# Patient Record
Sex: FEMALE | URBAN - METROPOLITAN AREA
[De-identification: names, ages, dates, MRNs, and addresses within clinical notes are randomized per-mention and may not be internally consistent; named-entity substitution may affect disease eponyms.]

---

## 2017-11-08 ENCOUNTER — INPATIENT (INPATIENT)
Facility: HOSPITAL | Age: 82
LOS: 4 days | Discharge: HOME | DRG: 378 | End: 2017-11-13
Attending: INTERNAL MEDICINE | Admitting: INTERNAL MEDICINE
Payer: MEDICARE

## 2017-11-08 VITALS
DIASTOLIC BLOOD PRESSURE: 64 MMHG | HEIGHT: 62 IN | OXYGEN SATURATION: 98 % | TEMPERATURE: 98 F | HEART RATE: 130 BPM | SYSTOLIC BLOOD PRESSURE: 107 MMHG | RESPIRATION RATE: 16 BRPM

## 2017-11-08 DIAGNOSIS — E78.00 PURE HYPERCHOLESTEROLEMIA, UNSPECIFIED: ICD-10-CM

## 2017-11-08 DIAGNOSIS — K21.9 GASTRO-ESOPHAGEAL REFLUX DISEASE WITHOUT ESOPHAGITIS: ICD-10-CM

## 2017-11-08 DIAGNOSIS — D50.9 IRON DEFICIENCY ANEMIA, UNSPECIFIED: ICD-10-CM

## 2017-11-08 DIAGNOSIS — E03.9 HYPOTHYROIDISM, UNSPECIFIED: ICD-10-CM

## 2017-11-08 DIAGNOSIS — K92.1 MELENA: ICD-10-CM

## 2017-11-08 DIAGNOSIS — F41.9 ANXIETY DISORDER, UNSPECIFIED: ICD-10-CM

## 2017-11-08 DIAGNOSIS — L02.429 FURUNCLE OF LIMB, UNSPECIFIED: Chronic | ICD-10-CM

## 2017-11-08 DIAGNOSIS — K92.2 GASTROINTESTINAL HEMORRHAGE, UNSPECIFIED: ICD-10-CM

## 2017-11-08 DIAGNOSIS — Z90.49 ACQUIRED ABSENCE OF OTHER SPECIFIED PARTS OF DIGESTIVE TRACT: Chronic | ICD-10-CM

## 2017-11-08 DIAGNOSIS — Z90.710 ACQUIRED ABSENCE OF BOTH CERVIX AND UTERUS: Chronic | ICD-10-CM

## 2017-11-08 DIAGNOSIS — Z29.9 ENCOUNTER FOR PROPHYLACTIC MEASURES, UNSPECIFIED: ICD-10-CM

## 2017-11-08 DIAGNOSIS — Z90.89 ACQUIRED ABSENCE OF OTHER ORGANS: Chronic | ICD-10-CM

## 2017-11-08 DIAGNOSIS — Z98.49 CATARACT EXTRACTION STATUS, UNSPECIFIED EYE: Chronic | ICD-10-CM

## 2017-11-08 DIAGNOSIS — Z96.653 PRESENCE OF ARTIFICIAL KNEE JOINT, BILATERAL: Chronic | ICD-10-CM

## 2017-11-08 PROBLEM — Z00.00 ENCOUNTER FOR PREVENTIVE HEALTH EXAMINATION: Status: ACTIVE | Noted: 2017-11-08

## 2017-11-08 LAB
ALBUMIN SERPL ELPH-MCNC: 3.2 G/DL — LOW (ref 3.3–5)
ALP SERPL-CCNC: 366 U/L — HIGH (ref 40–120)
ALT FLD-CCNC: 38 U/L — SIGNIFICANT CHANGE UP (ref 12–78)
ANION GAP SERPL CALC-SCNC: 9 MMOL/L — SIGNIFICANT CHANGE UP (ref 5–17)
APTT BLD: 30.6 SEC — SIGNIFICANT CHANGE UP (ref 27.5–37.4)
AST SERPL-CCNC: 35 U/L — SIGNIFICANT CHANGE UP (ref 15–37)
BASOPHILS # BLD AUTO: 0.1 K/UL — SIGNIFICANT CHANGE UP (ref 0–0.2)
BASOPHILS NFR BLD AUTO: 0.6 % — SIGNIFICANT CHANGE UP (ref 0–2)
BILIRUB SERPL-MCNC: 0.5 MG/DL — SIGNIFICANT CHANGE UP (ref 0.2–1.2)
BUN SERPL-MCNC: 33 MG/DL — HIGH (ref 7–23)
CALCIUM SERPL-MCNC: 9.2 MG/DL — SIGNIFICANT CHANGE UP (ref 8.5–10.1)
CHLORIDE SERPL-SCNC: 106 MMOL/L — SIGNIFICANT CHANGE UP (ref 96–108)
CO2 SERPL-SCNC: 25 MMOL/L — SIGNIFICANT CHANGE UP (ref 22–31)
CREAT SERPL-MCNC: 0.72 MG/DL — SIGNIFICANT CHANGE UP (ref 0.5–1.3)
EOSINOPHIL # BLD AUTO: 0 K/UL — SIGNIFICANT CHANGE UP (ref 0–0.5)
EOSINOPHIL NFR BLD AUTO: 0.5 % — SIGNIFICANT CHANGE UP (ref 0–6)
GLUCOSE SERPL-MCNC: 95 MG/DL — SIGNIFICANT CHANGE UP (ref 70–99)
HCT VFR BLD CALC: 33.6 % — LOW (ref 34.5–45)
HGB BLD-MCNC: 10.6 G/DL — LOW (ref 11.5–15.5)
INR BLD: 1.02 RATIO — SIGNIFICANT CHANGE UP (ref 0.88–1.16)
LYMPHOCYTES # BLD AUTO: 1 K/UL — SIGNIFICANT CHANGE UP (ref 1–3.3)
LYMPHOCYTES # BLD AUTO: 9.2 % — LOW (ref 13–44)
MCHC RBC-ENTMCNC: 30.8 PG — SIGNIFICANT CHANGE UP (ref 27–34)
MCHC RBC-ENTMCNC: 31.6 GM/DL — LOW (ref 32–36)
MCV RBC AUTO: 97.6 FL — SIGNIFICANT CHANGE UP (ref 80–100)
MONOCYTES # BLD AUTO: 0.4 K/UL — SIGNIFICANT CHANGE UP (ref 0–0.9)
MONOCYTES NFR BLD AUTO: 3.8 % — SIGNIFICANT CHANGE UP (ref 1–9)
NEUTROPHILS # BLD AUTO: 9.1 K/UL — HIGH (ref 1.8–7.4)
NEUTROPHILS NFR BLD AUTO: 85.9 % — HIGH (ref 43–77)
OB PNL STL: POSITIVE
PLATELET # BLD AUTO: 171 K/UL — SIGNIFICANT CHANGE UP (ref 150–400)
POTASSIUM SERPL-MCNC: 4.4 MMOL/L — SIGNIFICANT CHANGE UP (ref 3.5–5.3)
POTASSIUM SERPL-SCNC: 4.4 MMOL/L — SIGNIFICANT CHANGE UP (ref 3.5–5.3)
PROT SERPL-MCNC: 6.6 G/DL — SIGNIFICANT CHANGE UP (ref 6–8.3)
PROTHROM AB SERPL-ACNC: 11.1 SEC — SIGNIFICANT CHANGE UP (ref 9.8–12.7)
RBC # BLD: 3.44 M/UL — LOW (ref 3.8–5.2)
RBC # FLD: 13.6 % — SIGNIFICANT CHANGE UP (ref 10.3–14.5)
SODIUM SERPL-SCNC: 140 MMOL/L — SIGNIFICANT CHANGE UP (ref 135–145)
WBC # BLD: 10.6 K/UL — HIGH (ref 3.8–10.5)
WBC # FLD AUTO: 10.6 K/UL — HIGH (ref 3.8–10.5)

## 2017-11-08 PROCEDURE — 93010 ELECTROCARDIOGRAM REPORT: CPT

## 2017-11-08 PROCEDURE — 71010: CPT | Mod: 26

## 2017-11-08 PROCEDURE — 99281 EMR DPT VST MAYX REQ PHY/QHP: CPT

## 2017-11-08 RX ORDER — ATORVASTATIN CALCIUM 80 MG/1
0 TABLET, FILM COATED ORAL
Qty: 0 | Refills: 0 | COMMUNITY

## 2017-11-08 RX ORDER — SUCRALFATE 1 G
1 TABLET ORAL
Qty: 0 | Refills: 0 | Status: DISCONTINUED | OUTPATIENT
Start: 2017-11-08 | End: 2017-11-08

## 2017-11-08 RX ORDER — LEVOTHYROXINE SODIUM 125 MCG
25 TABLET ORAL DAILY
Qty: 0 | Refills: 0 | Status: DISCONTINUED | OUTPATIENT
Start: 2017-11-08 | End: 2017-11-13

## 2017-11-08 RX ORDER — PANTOPRAZOLE SODIUM 20 MG/1
8 TABLET, DELAYED RELEASE ORAL
Qty: 80 | Refills: 0 | Status: DISCONTINUED | OUTPATIENT
Start: 2017-11-08 | End: 2017-11-09

## 2017-11-08 RX ORDER — DOXEPIN HCL 100 MG
0 CAPSULE ORAL
Qty: 0 | Refills: 0 | COMMUNITY

## 2017-11-08 RX ORDER — OMEPRAZOLE 10 MG/1
0 CAPSULE, DELAYED RELEASE ORAL
Qty: 0 | Refills: 0 | COMMUNITY

## 2017-11-08 RX ORDER — SODIUM CHLORIDE 9 MG/ML
1000 INJECTION INTRAMUSCULAR; INTRAVENOUS; SUBCUTANEOUS ONCE
Qty: 0 | Refills: 0 | Status: COMPLETED | OUTPATIENT
Start: 2017-11-08 | End: 2017-11-08

## 2017-11-08 RX ORDER — PANTOPRAZOLE SODIUM 20 MG/1
40 TABLET, DELAYED RELEASE ORAL
Qty: 0 | Refills: 0 | Status: DISCONTINUED | OUTPATIENT
Start: 2017-11-08 | End: 2017-11-08

## 2017-11-08 RX ORDER — TRAZODONE HCL 50 MG
0 TABLET ORAL
Qty: 0 | Refills: 0 | COMMUNITY

## 2017-11-08 RX ORDER — EZETIMIBE 10 MG/1
0 TABLET ORAL
Qty: 0 | Refills: 0 | COMMUNITY

## 2017-11-08 RX ORDER — PANTOPRAZOLE SODIUM 20 MG/1
40 TABLET, DELAYED RELEASE ORAL ONCE
Qty: 0 | Refills: 0 | Status: COMPLETED | OUTPATIENT
Start: 2017-11-08 | End: 2017-11-08

## 2017-11-08 RX ORDER — ATORVASTATIN CALCIUM 80 MG/1
10 TABLET, FILM COATED ORAL AT BEDTIME
Qty: 0 | Refills: 0 | Status: DISCONTINUED | OUTPATIENT
Start: 2017-11-08 | End: 2017-11-13

## 2017-11-08 RX ORDER — LEVOTHYROXINE SODIUM 125 MCG
0 TABLET ORAL
Qty: 0 | Refills: 0 | COMMUNITY

## 2017-11-08 RX ORDER — TRAZODONE HCL 50 MG
50 TABLET ORAL AT BEDTIME
Qty: 0 | Refills: 0 | Status: DISCONTINUED | OUTPATIENT
Start: 2017-11-08 | End: 2017-11-13

## 2017-11-08 RX ORDER — SUCRALFATE 1 G
1 TABLET ORAL EVERY 6 HOURS
Qty: 0 | Refills: 0 | Status: DISCONTINUED | OUTPATIENT
Start: 2017-11-08 | End: 2017-11-12

## 2017-11-08 RX ADMIN — PANTOPRAZOLE SODIUM 40 MILLIGRAM(S): 20 TABLET, DELAYED RELEASE ORAL at 12:08

## 2017-11-08 RX ADMIN — SODIUM CHLORIDE 1000 MILLILITER(S): 9 INJECTION INTRAMUSCULAR; INTRAVENOUS; SUBCUTANEOUS at 12:09

## 2017-11-08 RX ADMIN — ATORVASTATIN CALCIUM 10 MILLIGRAM(S): 80 TABLET, FILM COATED ORAL at 22:35

## 2017-11-08 RX ADMIN — Medication 1 GRAM(S): at 17:20

## 2017-11-08 RX ADMIN — Medication 50 MILLIGRAM(S): at 22:35

## 2017-11-08 RX ADMIN — PANTOPRAZOLE SODIUM 10 MG/HR: 20 TABLET, DELAYED RELEASE ORAL at 17:17

## 2017-11-08 RX ADMIN — Medication 1 GRAM(S): at 23:03

## 2017-11-08 NOTE — ED PROVIDER NOTE - ATTENDING CONTRIBUTION TO CARE
Weakness and dark stool in this elderly female. Exam revealed elderly female in NAD with maroon discolored stools and benign abdomen. I agree with plan and management outlined by PA

## 2017-11-08 NOTE — H&P ADULT - PROBLEM SELECTOR PLAN 3
Continue trazadone, paxil Continue trazadone, paxil  Pt can bring doxepin from home due to pharmacy not having it Continue Trazadone, Paxil  Pt can bring doxepin from home due to pharmacy not having it

## 2017-11-08 NOTE — H&P ADULT - NEGATIVE GENERAL GENITOURINARY SYMPTOMS
no hematuria/no bladder infections/no incontinence/no flank pain L/no urine discoloration/no flank pain R/no renal colic

## 2017-11-08 NOTE — H&P ADULT - NSHPSOCIALHISTORY_GEN_ALL_CORE
Lives at home  Uses depends diapers due to fecal incontinence  Occupation:  Tobacco hx: denies  ETOH hx: half a glass of wine three times per week  Illicit drug use: denies Lives at home alone. Uses walker and cane to walk. 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 Uses walker and cane to walk. 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

## 2017-11-08 NOTE — H&P ADULT - PSH
H/O total hysterectomy    History of bilateral knee replacement    History of cataract surgery  bilaterally  History of cholecystectomy Furuncle of ankle  rt side s/p Sx, skin graft  H/O total hysterectomy    History of bilateral knee replacement    History of cataract surgery  bilaterally  History of cholecystectomy    S/P tonsillectomy

## 2017-11-08 NOTE — H&P ADULT - PROBLEM SELECTOR PLAN 7
DVT ppx: hold for now due to GI bleed. SCD's bilaterally DVT ppx: hold for now due to GI bleed. Hold asa. SCD's bilaterally

## 2017-11-08 NOTE — PATIENT PROFILE ADULT. - FALL HARM RISK
bones(Osteoporosis,prev fx,steroid use,metastatic bone ca/general weakness/age(85 years old or older)/other/coagulation(Bleeding disorder R/T clinical cond/anti-coags)

## 2017-11-08 NOTE — CONSULT NOTE ADULT - SUBJECTIVE AND OBJECTIVE BOX
Chief Complaint:  Patient is a 88y old  Female who presents with a chief complaint of bleeding from rectum (08 Nov 2017 14:23)      HPI: 89 yo F depression, anxiety, GERD, HLD, raynouds, iron deficiency anemia,  hypothyroid presents to ED with rectal bleeding. Pt's 3 daughters are at bedside. Pt states that 3-4 days ago, she noticed perfuse and persistent rectal bleeding. Had to change her diaper about 2-3 hours due to diaper being soaked with bloody diarrhea. Describes the diarrhea has dark, occasionally brown/red with black stools. Pt states that she has had many episodes like this in the past. Denies pain with any episodes. Last episode was early this year. Previous episode before that was about 5 years ago, but with less bleeding. 10 years ago, pt had episode with major bleeding where she had to go to [facility]. Pt states that episodes resolve on own after about 5 days. Pt unsure if shes ever gotten colonoscopy. Does not follow GI. Denies fevers, chills, nausea, vomiting, CP, SOB, abdominal pain, constipation, numbness, tingling. Denies sick contact, travel hx, changes in diet. Pt did not take medications this morning. (+) ASA daily, (+) naproxen    In the ED, VS temp: 97.5, HR 87, /72, RR 15, SpO2 98, WBC 10.6, Hg 3.44, Hct 33.6, platelets 171, PT 11.1, INR 1.02, PTT 30.6, Na 140, K 4.4, Cl 106, CO2 25, BUN 33, Cr 0.72, alk phos 366, AST 35, ALT 38. Occult blood positive. CXR: no active infiltrates. EKG: NSR at 72 bpm  In the ED, received 1000ml NaCl0.9 % bolus x 1, protonix 40 mg x 1    Allergies:  No Known Allergies      Medications:  atorvastatin 10 milliGRAM(s) Oral at bedtime  levothyroxine 25 MICROGram(s) Oral daily  multivitamin 1 Tablet(s) Oral daily  pantoprazole Infusion 8 mG/Hr IV Continuous <Continuous>  PARoxetine 10 milliGRAM(s) Oral daily  sucralfate suspension 1 Gram(s) Oral every 6 hours  traZODone 50 milliGRAM(s) Oral at bedtime      PMHX/PSHX:  Raynaud disease  Iron deficiency anemia  Anxiety  Diverticulitis  Hypercholesteremia  Hypothyroid  GERD (gastroesophageal reflux disease)  Depression  History of cataract surgery  H/O total hysterectomy  History of cholecystectomy  History of bilateral knee replacement  No significant past surgical history      Family history:  No pertinent family history in first degree relatives      Social History: denies etoh, tobacco, illicit drugs    ROS:     General:  No wt loss, fevers, chills, night sweats, fatigue,   Eyes:  Good vision, no reported pain  ENT:  No sore throat, pain, runny nose, dysphagia  CV:  No pain, palpitations, hypo/hypertension  Resp:  No dyspnea, cough, tachypnea, wheezing  GI:  No pain, No nausea, No vomiting, No diarrhea, No constipation, No weight loss, No fever, No pruritis, No rectal bleeding, No tarry stools, No dysphagia,  :  No pain, bleeding, incontinence, nocturia  Muscle:  No pain, weakness  Neuro:  No weakness, tingling, memory problems  Psych:  No fatigue, insomnia, mood problems, depression  Endocrine:  No polyuria, polydipsia, cold/heat intolerance  Heme:  No petechiae, ecchymosis, easy bruisability  Skin:  No rash, tattoos, scars, edema      PHYSICAL EXAM:   Vital Signs:  Vital Signs Last 24 Hrs  T(C): 36.4 (08 Nov 2017 11:23), Max: 36.4 (08 Nov 2017 11:23)  T(F): 97.5 (08 Nov 2017 11:23), Max: 97.5 (08 Nov 2017 11:23)  HR: 82 (08 Nov 2017 15:42) (82 - 130)  BP: 137/71 (08 Nov 2017 15:42) (107/64 - 137/71)  BP(mean): --  RR: 15 (08 Nov 2017 15:42) (15 - 16)  SpO2: 97% (08 Nov 2017 15:42) (97% - 98%)  Daily Height in cm: 157.48 (08 Nov 2017 11:23)    Daily     GENERAL:  Appears stated age, well-groomed, well-nourished, no distress  HEENT:  NC/AT,  conjunctivae clear and pink, no thyromegaly, nodules, adenopathy, no JVD, sclera -anicteric  CHEST:  Full & symmetric excursion, no increased effort, breath sounds clear  HEART:  Regular rhythm, S1, S2, no murmur/rub/S3/S4, no abdominal bruit, no edema  ABDOMEN:  Soft, non-tender, non-distended, normoactive bowel sounds,  no masses ,no hepato-splenomegaly, no signs of chronic liver disease  EXTEREMITIES:  no cyanosis,clubbing or edema  SKIN:  No rash/erythema/ecchymoses/petechiae/wounds/abscess/warm/dry  NEURO:  Alert, oriented, no asterixis, no tremor, no encephalopathy  RECTAL: (+) melena    LABS:                        10.6   10.6  )-----------( 171      ( 08 Nov 2017 12:01 )             33.6     11-08    140  |  106  |  33<H>  ----------------------------<  95  4.4   |  25  |  0.72    Ca    9.2      08 Nov 2017 12:01    TPro  6.6  /  Alb  3.2<L>  /  TBili  0.5  /  DBili  x   /  AST  35  /  ALT  38  /  AlkPhos  366<H>  11-08    LIVER FUNCTIONS - ( 08 Nov 2017 12:01 )  Alb: 3.2 g/dL / Pro: 6.6 g/dL / ALK PHOS: 366 U/L / ALT: 38 U/L / AST: 35 U/L / GGT: x           PT/INR - ( 08 Nov 2017 12:01 )   PT: 11.1 sec;   INR: 1.02 ratio         PTT - ( 08 Nov 2017 12:01 )  PTT:30.6 sec        Imaging:

## 2017-11-08 NOTE — ED PROVIDER NOTE - OBJECTIVE STATEMENT
87 yo female accompanied to ed by daughter presents with episodes of dark blood per rectum x 2-3 days , no nv, denies abdominal pain,  no fever.  states has hx of diverticulitis.  No GI doctor. denies hx of abdominal surgeries.   PMd Dr Fragoso

## 2017-11-08 NOTE — H&P ADULT - NEGATIVE ENMT SYMPTOMS
no nasal congestion/no nasal obstruction/no tinnitus/no hearing difficulty/no nasal discharge/no throat pain/no vertigo/no post-nasal discharge/no dry mouth/no ear pain/no sinus symptoms

## 2017-11-08 NOTE — H&P ADULT - NEGATIVE OPHTHALMOLOGIC SYMPTOMS
no lacrimation L/no lacrimation R/no pain R/no loss of vision L/no diplopia/no irritation R/no photophobia/no discharge L/no discharge R/no pain L/no irritation L/no loss of vision R/no blurred vision R/no blurred vision L

## 2017-11-08 NOTE — H&P ADULT - RS GEN PE MLT RESP DETAILS PC
respirations non-labored/breath sounds equal/no subcutaneous emphysema/no rhonchi/normal/no wheezes/airway patent/good air movement/no chest wall tenderness/no intercostal retractions/clear to auscultation bilaterally/no rales

## 2017-11-08 NOTE — ED ADULT NURSE NOTE - NS TRANSFER PATIENT BELONGINGS
Visit Information Date & Time Provider Department Dept. Phone Encounter #  
 7/20/2017 11:15 AM Allison Molina, [facility] [phone] [id number] Follow-up Instructions Return for move 8/2/17 appt to later in month- about 4 weeks. .  
  
Your Appointments 8/2/2017  8:00 AM  
Follow Up with MD Suzi Camacho 23 [facility] Appt Note: 3 wk f/u  
 [facility] [place]  
  
   
 [address] [facility]  
  
    
 2/12/2018  9:00 AM  
Office Visit with MD Suzi Camacho 23 [facility] [place]  
  
   
 [address] [facility] Upcoming Health Maintenance Date Due DTaP/Tdap/Td series (1 - Tdap) 11/14/1951 INFLUENZA AGE 9 TO ADULT 8/1/2017 MEDICARE YEARLY EXAM 2/11/2018 GLAUCOMA SCREENING Q2Y 7/13/2019 Allergies as of 7/20/2017  Review Complete On: 7/20/2017 By: Allison Molina MD  
  
 Severity Noted Reaction Type Reactions Ace Inhibitors  11/29/2010    Cough Codeine  11/19/2010    Other (comments)  
 headache Effexor [Venlafaxine]  06/14/2017    Other (comments)  
 confusion Fosamax [Alendronate]  11/19/2010    Other (comments) peridontal pain Procardia [Nifedipine]  11/19/2010    Swelling Current Immunizations  Reviewed on 9/9/2016 Name Date Influenza High Dose Vaccine PF 11/15/2016 Influenza Vaccine 11/5/2014, 10/8/2013 Influenza Vaccine (Quad) 9/8/2015 12:00 PM  
 Pneumococcal Conjugate (PCV-13) 11/15/2016 Pneumococcal Vaccine (Unspecified Type) 11/7/2011 Not reviewed this visit You Were Diagnosed With   
  
 Codes Comments Oral candidiasis    -  Primary ICD-10-CM: B37.0 ICD-9-CM: 112.0 Cheilitis     ICD-10-CM: K13.0 ICD-9-CM: 528.5 Vitals BP Pulse Temp Resp Height(growth percentile) Weight(growth percentile) 139/42 71 97.6 °F (36.4 °C) (Oral) 16 5' 6\" (1.676 m) 154 lb (69.9 kg) BMI Smoking Status 24.86 kg/m2 Former Smoker BMI and BSA Data Body Mass Index Body Surface Area  
 24.86 kg/m 2 1.8 m 2 Preferred Pharmacy Pharmacy Name Phone [facility] [place], [address] [place] [address] [phone] Your Updated Medication List  
  
   
This list is accurate as of: 7/20/17 11:29 AM.  Always use your most recent med list.  
  
  
  
  
 albuterol 90 mcg/actuation inhaler Commonly known as:  PROVENTIL HFA, VENTOLIN HFA, PROAIR HFA Take 1 Puff by inhalation every four (4) hours as needed for Wheezing. aspirin delayed-release 81 mg tablet Take 1 Tab by mouth daily. atorvastatin 40 mg tablet Commonly known as:  LIPITOR Take 40 mg by mouth daily. calcium-cholecalciferol (D3) tablet Commonly known as:  CALCARB 600 WITH VITAMIN D Take 1 Tab by mouth two (2) times a day. * clotrimazole 1 % topical cream  
Commonly known as:  Terra [place] Apply  to affected area two (2) times a day. Use for 2 weeks. * clotrimazole 10 mg lidia Commonly known as:  [place] Take 1 Tab by mouth five (5) times daily for 10 days. cyanocobalamin 1,000 mcg tablet Commonly known as:  VITAMIN B-12 Take 1 Tab by mouth daily. metoprolol tartrate 25 mg tablet Commonly known as:  LOPRESSOR Take 12.5 mg by mouth daily. pravastatin 40 mg tablet Commonly known as:  PRAVACHOL Take 1 Tab by mouth nightly. * Notice: This list has 2 medication(s) that are the same as other medications prescribed for you. Read the directions carefully, and ask your doctor or other care provider to review them with you. Prescriptions Sent to Pharmacy  Refills  
 clotrimazole (MYCELEX) 10 mg lidia 0  
 Sig: Take 1 Tab by mouth five (5) times daily for 10 days. Class: Normal  
 [facility] [address] [place] [address] #: [phone] Route: Oral  
  
Follow-up Instructions Return for move 8/2/17 appt to later in month- about 4 weeks. .  
  
  
Patient Instructions Stomatitis: Care Instructions Your Care Instructions Stomatitis is swelling and redness of the lining of your mouth. It can cause painful sores that can make it hard for you to eat, drink, or swallow. Stomatitis may be caused by a viral or bacterial infection, a disease, or not taking care of your teeth and gums properly. Other causes include reactions to smoking, burns from hot food or drinks, or an allergic reaction. Allergy causes may be a result of flavorings such as spearmint or cinnamon that are used in chewing gum, toothpaste, soft drinks, candies, and other products. Your doctor may prescribe pain medicines or special mouth rinses. He or she may tell you to quit using some products that may be causing the sores. It can take up to 2 weeks for the sores to heal. 
Some people with stomatitis also get a yeast infection of the mouth, called thrush. Medicines can treat this problem. Follow-up care is a key part of your treatment and safety. Be sure to make and go to all appointments, and call your doctor if you are having problems. It's also a good idea to know your test results and keep a list of the medicines you take. How can you care for yourself at home? · Be safe with medicines. Read and follow all instructions on the label. ¨ If the doctor gave you a prescription medicine for pain, take it as prescribed. ¨ If you are not taking a prescription pain medicine, ask your doctor if you can take an over-the-counter medicine. · If your doctor prescribed antibiotics, take them as directed.  Do not stop taking them just because you feel better. You need to take the full course of antibiotics. · Use prescription mouth rinses as prescribed. Ask your doctor if you can freeze the mouth rinse in an ice cube tray. Sucking on a frozen cube of the mouth rinse can help ease the pain. · Make a rinse to keep your mouth from getting dry. Add 1 teaspoon baking soda and ½ teaspoon salt to a quart of water. Use it to rinse your mouth 4 to 6 times each day. Spit out the rinse. Do not swallow it. · Do not use a mouthwash or other over-the-counter rinse with alcohol. These can dry out your mouth or cause more pain. · If your doctor gave you mouthwash or lozenges to treat your yeast infection, use them as directed. · Eat soft foods such as mashed potatoes and other cooked vegetables, noodles, applesauce, clear broth soups, yogurt, and cottage cheese. You can get extra protein by adding protein powder to milk shakes or breakfast drinks. Avoid eating spicy or crunchy foods. · Try eating cold foods such as ice cream or yogurt. · Avoid drinking high-acid juices such as orange, grapefruit, and cranberry juices. · Drink plenty of fluids to prevent dehydration. Drinking through a straw may help with pain. · Practice good oral hygiene. Use a very soft toothbrush to brush your teeth at least two times a day. Or use your finger to brush your teeth if your mouth is sore. When should you call for help? Call your doctor now or seek immediate medical care if: 
· You have new or worse symptoms of infection, such as: 
¨ Increased pain, swelling, warmth, or redness. ¨ Red streaks leading from the area. ¨ Pus draining from the area. ¨ A fever. Watch closely for changes in your health, and be sure to contact your doctor if: 
· You do not get better as expected. Where can you learn more? Go to http://gwendolyn-gay.info/. Enter A043 in the search box to learn more about \"Stomatitis: Care Instructions. \" 
 Clothing Current as of: November 17, 2016 Content Version: 11.3 © 5829-4220 [employer]. Care instructions adapted under license by [employer] (which disclaims liability or warranty for this information). If you have questions about a medical condition or this instruction, always ask your healthcare professional. [address] any warranty or liability for your use of this information. Keep using the clotrimazole cream on the corners of your mouth until your follow up. Introducing [facility]! [facility] [facility] introduces [employer] patient portal. Now you can access parts of your medical record, email your doctor's office, and request medication refills online. 1. In your internet browser, go to [email or web address] 2. Click on the First Time User? Click Here link in the Sign In box. You will see the New Member Sign Up page. 3. Enter your [employer] Access Code exactly as it appears below. You will not need to use this code after youve completed the sign-up process. If you do not sign up before the expiration date, you must request a new code. · [employer] Access Code: [email or web address] Expires: 10/11/2017  8:56 AM 
 
4. Enter the last four digits of your Social Security Number (xxxx) and Date of Birth (mm/dd/yyyy) as indicated and click Submit. You will be taken to the next sign-up page. 5. Create a [employer] ID. This will be your [employer] login ID and cannot be changed, so think of one that is secure and easy to remember. 6. Create a [employer] password. You can change your password at any time. 7. Enter your Password Reset Question and Answer. This can be used at a later time if you forget your password. 8. Enter your e-mail address. You will receive e-mail notification when new information is available in [address] 9. Click Sign Up. You can now view and download portions of your medical record. 10. Click the Download Summary menu link to download a portable copy of your medical information. If you have questions, please visit the Frequently Asked Questions section of the [employer] website. Remember, [employer] is NOT to be used for urgent needs. For medical emergencies, dial 911. Now available from your iPhone and Android! Please provide this summary of care documentation to your next provider. Your primary care clinician is listed as PAUL Medina. If you have any questions after today's visit, please call [phone].

## 2017-11-08 NOTE — H&P ADULT - PROBLEM SELECTOR PLAN 1
FOBT positive  Hg 10.6, hct 33.6  S/p 1000 mL NaCL x 1 bolus  GI Dr. barros.  F/u recs FOBT positive  Hg 10.6, hct 33.6  S/p 1000 mL NaCL x 1 bolus  GI Dr. Caal consulted. F/u recs  Carafate 1 g BID  Clear liquid diet  Out of bed to chair with assitence  Fall precautions   F/u PT eval  Type and screen FOBT positive, r/o upper GI bleed, 2/2 NSAIDS, ASA d/d gastritis, PUD  Protonix drip,   CBC daily, No active bleeding  Hg 10.6, hct 33.6  S/p 1000 mL NaCL x 1 bolus  GI Dr. GILBERTO Caal consulted. F/u recs-EGD in AM  Carafate 1 g BID  Clear liquid diet  Type and screen

## 2017-11-08 NOTE — H&P ADULT - HISTORY OF PRESENT ILLNESS
87 yo F depression, anxiety, GERD, HLD, diverticulitis, iron deficiency anemia,  hypothyroid presents to ED with rectal bleeding. Pt's 3 daughters are at bedside. Pt states that 3-4 days ago, she noticed perfuse and persistent rectal bleeding. Had to change her diaper about 2-3 hours due to diaper being soaked with bloody diarrhea. Pt states that she has had many episodes like this in the past. Denies pain with any episodes. Last episode was early this year. Previous episode before that was about 5 years ago, but with less bleeding. 10 years ago, pt had episode with major bleeding where she had to go to [facility]. Pt states that episodes resolve on own. Pt unsure if shes ever gotten colonoscopy. Does not follow GI. Denies fevers, chills, nausea, vomiting, CP, SOB, abdominal pain, constipation, numbness, tingling. Denies sick contact, travel hx, changes in diet.    In the ED, VS temp: 97.5, HR 87, /72, RR 15, SpO2 98, WBC 10.6, Hg 3.44, Hct 33.6, platelets 171, PT 11.1, INR 1.02, PTT 30.6, Na 140, K 4.4, Cl 106, CO2 25, BUN 33, Cr 0.72, alk phos 366, AST 35, ALT 38. Occult blood positive. CXR: no active infiltrates. EKG:   In the ED, received 1000ml NaCl0.9 % bolus x 1, protonix 40 mg x 1 87 yo F depression, anxiety, GERD, HLD, raynouds, iron deficiency anemia,  hypothyroid presents to ED with rectal bleeding. Pt's 3 daughters are at bedside. Pt states that 3-4 days ago, she noticed perfuse and persistent rectal bleeding. Had to change her diaper about 2-3 hours due to diaper being soaked with bloody diarrhea. Describes the diarrhea has dark, occasionally brown/red with black stools. Pt states that she has had many episodes like this in the past. Denies pain with any episodes. Last episode was early this year. Previous episode before that was about 5 years ago, but with less bleeding. 10 years ago, pt had episode with major bleeding where she had to go to [facility]. Pt states that episodes resolve on own after about 5 days. Pt unsure if shes ever gotten colonoscopy. Does not follow GI. Denies fevers, chills, nausea, vomiting, CP, SOB, abdominal pain, constipation, numbness, tingling. Denies sick contact, travel hx, changes in diet. Pt did not take medications this morning.    In the ED, VS temp: 97.5, HR 87, /72, RR 15, SpO2 98, WBC 10.6, Hg 3.44, Hct 33.6, platelets 171, PT 11.1, INR 1.02, PTT 30.6, Na 140, K 4.4, Cl 106, CO2 25, BUN 33, Cr 0.72, alk phos 366, AST 35, ALT 38. Occult blood positive. CXR: no active infiltrates. EKG:   In the ED, received 1000ml NaCl0.9 % bolus x 1, protonix 40 mg x 1 89 yo F depression, anxiety, GERD, HLD, raynouds, iron deficiency anemia,  hypothyroid presents to ED with rectal bleeding. Pt's 3 daughters are at bedside. Pt states that 3-4 days ago, she noticed perfuse and persistent rectal bleeding. Had to change her diaper about 2-3 hours due to diaper being soaked with bloody diarrhea. Describes the diarrhea has dark, occasionally brown/red with black stools. Pt states that she has had many episodes like this in the past. Denies pain with any episodes. Last episode was early this year. Previous episode before that was about 5 years ago, but with less bleeding. 10 years ago, pt had episode with major bleeding where she had to go to [facility]. Pt states that episodes resolve on own after about 5 days. Pt unsure if shes ever gotten colonoscopy. Does not follow GI. Denies fevers, chills, nausea, vomiting, CP, SOB, abdominal pain, constipation, numbness, tingling. Denies sick contact, travel hx, changes in diet. Pt did not take medications this morning.    In the ED, VS temp: 97.5, HR 87, /72, RR 15, SpO2 98, WBC 10.6, Hg 3.44, Hct 33.6, platelets 171, PT 11.1, INR 1.02, PTT 30.6, Na 140, K 4.4, Cl 106, CO2 25, BUN 33, Cr 0.72, alk phos 366, AST 35, ALT 38. Occult blood positive. CXR: no active infiltrates. EKG: NSR at 72 bpm  In the ED, received 1000ml NaCl0.9 % bolus x 1, protonix 40 mg x 1 87 yo F depression, anxiety, GERD, HLD, raynouds, iron deficiency anemia,  hypothyroid presents to ED with rectal bleeding. Pt's 3 daughters are at bedside. Pt states that 3-4 days ago, she noticed perfuse and persistent rectal bleeding. Had to change her diaper about 2-3 hours due to diaper being soaked with bloody diarrhea. Describes the diarrhea has dark, occasionally brown/red with black stools. Pt states that she has had many episodes like this in the past. Denies pain with any episodes. Last episode was early this year. Previous episode before that was about 5 years ago, but with less bleeding. 10 years ago, pt had episode with major bleeding where she had to go to [facility]. Pt states that episodes resolve on own after about 5 days. Pt unsure if shes ever gotten colonoscopy. Does not follow GI. Denies fevers, chills, nausea, vomiting, CP, SOB, abdominal pain, constipation, numbness, tingling. Denies sick contact, travel hx, changes in diet. Pt did not take medications this morning.    In the ED, VS temp: 97.5, HR 87, /72, RR 15, SpO2 98, WBC 10.6, Hg 3.44, Hct 33.6, platelets 171, PT 11.1, INR 1.02, PTT 30.6, Na 140, K 4.4, Cl 106, CO2 25, BUN 33, Cr 0.72, alk phos 366, AST 35, ALT 38. Occult blood positive. CXR: no active infiltrates. EKG: NSR at 72 bpm  In the ED, received 1000ml NaCl0.9 % bolus x 1, Protonix 40 mg x 1 dose.

## 2017-11-08 NOTE — H&P ADULT - NEGATIVE MUSCULOSKELETAL SYMPTOMS
no arm pain L/no arm pain R/no arthralgia/no arthritis/no myalgia/no muscle cramps/no back pain/no leg pain L/no joint swelling/no neck pain/no stiffness/no leg pain R/no muscle weakness

## 2017-11-08 NOTE — H&P ADULT - PMH
Anxiety    Depression    Diverticulitis    GERD (gastroesophageal reflux disease)    Hypercholesteremia    Hypothyroid    Iron deficiency anemia Anxiety    Depression    GERD (gastroesophageal reflux disease)    Hypercholesteremia    Hypothyroid    Iron deficiency anemia    Raynaud disease Anxiety    Depression    Diverticulosis    GERD (gastroesophageal reflux disease)    Hypercholesteremia    Hypothyroid    Iron deficiency anemia    Raynaud disease

## 2017-11-08 NOTE — H&P ADULT - NEGATIVE NEUROLOGICAL SYMPTOMS
no syncope/no tremors/no difficulty walking/no weakness/no headache/no generalized seizures/no paresthesias/no confusion/no vertigo/no loss of sensation/no loss of consciousness

## 2017-11-08 NOTE — H&P ADULT - ASSESSMENT
89 yo F depression, anxiety, GERD, HLD, diverticulitis, iron deficiency anemia,  hypothyroid presents to ED with rectal bleeding. 87 yo F depression, anxiety, GERD, HLD, raynouds, iron deficiency anemia,  hypothyroid presents to ED with rectal bleeding. Admit with GI bleed. 87 yo F depression, anxiety, GERD, HLD, raynouds, iron deficiency anemia,  hypothyroid presents to ED with Emma. Admit with  possible upper GI bleed.

## 2017-11-08 NOTE — CONSULT NOTE ADULT - PROBLEM SELECTOR RECOMMENDATION 9
(+) melena on rectal exam  given ASA/NSAID use - patient likely having an UGIB  IV protonix drip, carafate 1 gram q 6 hours, IVF hydration  monitor h/h, transfuse as needed  NPO past midnight, EGD tomorrow  cardiac optimization prior to procedure  avoid NSAIDs

## 2017-11-08 NOTE — H&P ADULT - NEUROLOGICAL DETAILS
alert and oriented x 3/responds to pain/responds to verbal commands/sensation intact/cranial nerves intact responds to verbal commands/responds to pain/sensation intact/cranial nerves intact/normal strength/alert and oriented x 3

## 2017-11-08 NOTE — H&P ADULT - ATTENDING COMMENTS
Pt seen, examined, case & care plan d/w residents, pt, dtr , GI Dr GILBERTO Caal at detail.  NPO after midnight for Am EGD, Pt is medically stable for schedule EGD in AM  SCD, NO AC

## 2017-11-08 NOTE — H&P ADULT - MUSCULOSKELETAL
detailed exam normal/no calf tenderness/no joint warmth/no joint swelling/no joint erythema/ROM intact details…

## 2017-11-08 NOTE — PATIENT PROFILE ADULT. - PMH
Anxiety    Depression    GERD (gastroesophageal reflux disease)    Hypercholesteremia    Hypothyroid    Iron deficiency anemia    Raynaud disease

## 2017-11-08 NOTE — PATIENT PROFILE ADULT. - PSH
H/O total hysterectomy    History of bilateral knee replacement    History of cataract surgery  bilaterally  History of cholecystectomy

## 2017-11-08 NOTE — PATIENT PROFILE ADULT. - NS TRANSFER PATIENT BELONGINGS
Clothing/Jewelry/Money (specify)/one ring, 2earings Jewelry/Money (specify)/Clothing/Electronic Device (specify)

## 2017-11-08 NOTE — H&P ADULT - GASTROINTESTINAL DETAILS
no guarding/no rebound tenderness/soft/normal no organomegaly/normal/no masses palpable/no bruit/scar/no rebound tenderness/bowel sounds normal/no rigidity/nontender/no guarding/no distention/soft

## 2017-11-08 NOTE — H&P ADULT - PROBLEM SELECTOR PLAN 2
On chronic iron supplementation at home. On chronic iron supplementation at home.  F/u iron studies in am

## 2017-11-09 ENCOUNTER — RESULT REVIEW (OUTPATIENT)
Age: 82
End: 2017-11-09

## 2017-11-09 ENCOUNTER — TRANSCRIPTION ENCOUNTER (OUTPATIENT)
Age: 82
End: 2017-11-09

## 2017-11-09 LAB
ANION GAP SERPL CALC-SCNC: 8 MMOL/L — SIGNIFICANT CHANGE UP (ref 5–17)
BUN SERPL-MCNC: 20 MG/DL — SIGNIFICANT CHANGE UP (ref 7–23)
CALCIUM SERPL-MCNC: 8.5 MG/DL — SIGNIFICANT CHANGE UP (ref 8.5–10.1)
CHLORIDE SERPL-SCNC: 114 MMOL/L — HIGH (ref 96–108)
CO2 SERPL-SCNC: 25 MMOL/L — SIGNIFICANT CHANGE UP (ref 22–31)
CREAT SERPL-MCNC: 0.51 MG/DL — SIGNIFICANT CHANGE UP (ref 0.5–1.3)
FERRITIN SERPL-MCNC: 47 NG/ML — SIGNIFICANT CHANGE UP (ref 15–150)
FOLATE SERPL-MCNC: >20 NG/ML — SIGNIFICANT CHANGE UP (ref 4.8–24.2)
GLUCOSE SERPL-MCNC: 76 MG/DL — SIGNIFICANT CHANGE UP (ref 70–99)
HCT VFR BLD CALC: 26.4 % — LOW (ref 34.5–45)
HGB BLD-MCNC: 8.6 G/DL — LOW (ref 11.5–15.5)
IRON SATN MFR SERPL: 15 % — SIGNIFICANT CHANGE UP (ref 14–50)
IRON SATN MFR SERPL: 30 UG/DL — SIGNIFICANT CHANGE UP (ref 30–160)
MCHC RBC-ENTMCNC: 31.8 PG — SIGNIFICANT CHANGE UP (ref 27–34)
MCHC RBC-ENTMCNC: 32.6 GM/DL — SIGNIFICANT CHANGE UP (ref 32–36)
MCV RBC AUTO: 97.4 FL — SIGNIFICANT CHANGE UP (ref 80–100)
PLATELET # BLD AUTO: 163 K/UL — SIGNIFICANT CHANGE UP (ref 150–400)
POTASSIUM SERPL-MCNC: 4.1 MMOL/L — SIGNIFICANT CHANGE UP (ref 3.5–5.3)
POTASSIUM SERPL-SCNC: 4.1 MMOL/L — SIGNIFICANT CHANGE UP (ref 3.5–5.3)
RBC # BLD: 2.69 M/UL — LOW (ref 3.8–5.2)
RBC # BLD: 2.69 M/UL — LOW (ref 3.8–5.2)
RBC # FLD: 13.8 % — SIGNIFICANT CHANGE UP (ref 10.3–14.5)
RETICS #: 78.8 K/UL — SIGNIFICANT CHANGE UP (ref 25–125)
RETICS/RBC NFR: 2.9 % — HIGH (ref 0.5–2.5)
SODIUM SERPL-SCNC: 147 MMOL/L — HIGH (ref 135–145)
T3 SERPL-MCNC: 91 NG/DL — SIGNIFICANT CHANGE UP (ref 80–200)
T4 AB SER-ACNC: 7.3 UG/DL — SIGNIFICANT CHANGE UP (ref 4.6–12)
TIBC SERPL-MCNC: 205 UG/DL — LOW (ref 220–430)
TSH SERPL-MCNC: 3.83 UIU/ML — HIGH (ref 0.36–3.74)
UIBC SERPL-MCNC: 175 UG/DL — SIGNIFICANT CHANGE UP (ref 110–370)
VIT B12 SERPL-MCNC: 1096 PG/ML — HIGH (ref 243–894)
WBC # BLD: 8 K/UL — SIGNIFICANT CHANGE UP (ref 3.8–10.5)
WBC # FLD AUTO: 8 K/UL — SIGNIFICANT CHANGE UP (ref 3.8–10.5)

## 2017-11-09 PROCEDURE — 88312 SPECIAL STAINS GROUP 1: CPT | Mod: 26

## 2017-11-09 PROCEDURE — 88342 IMHCHEM/IMCYTCHM 1ST ANTB: CPT | Mod: 26

## 2017-11-09 PROCEDURE — 88305 TISSUE EXAM BY PATHOLOGIST: CPT | Mod: 26

## 2017-11-09 RX ORDER — PANTOPRAZOLE SODIUM 20 MG/1
40 TABLET, DELAYED RELEASE ORAL
Qty: 0 | Refills: 0 | Status: DISCONTINUED | OUTPATIENT
Start: 2017-11-09 | End: 2017-11-12

## 2017-11-09 RX ORDER — SOD SULF/SODIUM/NAHCO3/KCL/PEG
1000 SOLUTION, RECONSTITUTED, ORAL ORAL EVERY 4 HOURS
Qty: 0 | Refills: 0 | Status: COMPLETED | OUTPATIENT
Start: 2017-11-09 | End: 2017-11-09

## 2017-11-09 RX ADMIN — Medication 20 MILLIGRAM(S): at 21:03

## 2017-11-09 RX ADMIN — ATORVASTATIN CALCIUM 10 MILLIGRAM(S): 80 TABLET, FILM COATED ORAL at 21:03

## 2017-11-09 RX ADMIN — PANTOPRAZOLE SODIUM 10 MG/HR: 20 TABLET, DELAYED RELEASE ORAL at 02:09

## 2017-11-09 RX ADMIN — Medication 1 GRAM(S): at 19:00

## 2017-11-09 RX ADMIN — Medication 1000 MILLILITER(S): at 19:00

## 2017-11-09 RX ADMIN — PANTOPRAZOLE SODIUM 10 MG/HR: 20 TABLET, DELAYED RELEASE ORAL at 11:54

## 2017-11-09 RX ADMIN — Medication 50 MILLIGRAM(S): at 21:03

## 2017-11-09 RX ADMIN — PANTOPRAZOLE SODIUM 40 MILLIGRAM(S): 20 TABLET, DELAYED RELEASE ORAL at 18:59

## 2017-11-09 NOTE — DISCHARGE NOTE ADULT - MEDICATION SUMMARY - MEDICATIONS TO TAKE
I will START or STAY ON the medications listed below when I get home from the hospital:    traZODone  -- 50 milligram(s) by mouth once a day (at bedtime)  -- Indication: For Depression    PARoxetine 20 mg oral tablet  -- 2.5 tab(s) by mouth once a day  -- Indication: For Depression    ezetimibe  -- 10 milligram(s) by mouth once a day  -- Indication: For Cholesterol    atorvastatin 10 mg oral tablet  -- 1 tab(s) by mouth once a day  -- Indication: For Cholesterol    doxepin  -- 10 milligram(s) by mouth once a day  -- Indication: For Anxiety    Dextrose 5% in Water intravenous solution  -- 1000 milliliter(s) intravenous   -- Indication: For Hypernatremia    Iron 100 Plus oral tablet  -- 1 cap(s) by mouth every other day  -- Indication: For Iron deficiency anemia    sucralfate 1 g/10 mL oral suspension  -- 10 milliliter(s) by mouth every 6 hours  -- Indication: For Lower GI bleed    omeprazole  -- 40 milligram(s) by mouth once a day  -- Indication: For GERD (gastroesophageal reflux disease)    pantoprazole 40 mg oral delayed release tablet  -- 1 tab(s) by mouth 2 times a day (before meals)  -- Indication: For GERD (gastroesophageal reflux disease)    Synthroid 25 mcg (0.025 mg) oral tablet  -- 1 tab(s) by mouth once a day  -- Indication: For Hypothyroid    Multiple Vitamins oral tablet  -- 1 tab(s) by mouth once a day  -- Indication: For Supplement I will START or STAY ON the medications listed below when I get home from the hospital:    aspirin 81 mg oral delayed release tablet  -- 1 tab(s) by mouth once a day with meals  -- Indication: For Cardiac protection    traZODone  -- 50 milligram(s) by mouth once a day (at bedtime)  -- Indication: For Depression    PARoxetine 20 mg oral tablet  -- 2.5 tab(s) by mouth once a day  -- Indication: For Depression    ezetimibe  -- 10 milligram(s) by mouth once a day  -- Indication: For Cholesterol    atorvastatin 10 mg oral tablet  -- 1 tab(s) by mouth once a day  -- Indication: For Cholesterol    doxepin  -- 10 milligram(s) by mouth once a day  -- Indication: For Anxiety    Dextrose 5% in Water intravenous solution  -- 1000 milliliter(s) intravenous   -- Indication: For Hypernatremia    Iron 100 Plus oral tablet  -- 1 cap(s) by mouth every other day  -- Indication: For Iron deficiency anemia    omeprazole 40 mg oral delayed release capsule  -- 1 cap(s) by mouth once a day  -- Indication: For GERD (gastroesophageal reflux disease)    Synthroid 25 mcg (0.025 mg) oral tablet  -- 1 tab(s) by mouth once a day  -- Indication: For Hypothyroid    Multiple Vitamins oral tablet  -- 1 tab(s) by mouth once a day  -- Indication: For Supplement I will START or STAY ON the medications listed below when I get home from the hospital:    aspirin 81 mg oral delayed release tablet  -- 1 tab(s) by mouth once a day with meals  -- Indication: For Cardiac protection    traZODone  -- 50 milligram(s) by mouth once a day (at bedtime)  -- Indication: For Depression    PARoxetine 20 mg oral tablet  -- 2.5 tab(s) by mouth once a day  -- Indication: For Depression    ezetimibe  -- 10 milligram(s) by mouth once a day  -- Indication: For Cholesterol    atorvastatin 10 mg oral tablet  -- 1 tab(s) by mouth once a day  -- Indication: For Cholesterol    doxepin  -- 10 milligram(s) by mouth once a day  -- Indication: For Anxiety    Iron 100 Plus oral tablet  -- 1 cap(s) by mouth every other day  -- Indication: For Iron deficiency anemia    omeprazole 40 mg oral delayed release capsule  -- 1 cap(s) by mouth once a day  -- Indication: For GERD (gastroesophageal reflux disease)    Synthroid 25 mcg (0.025 mg) oral tablet  -- 1 tab(s) by mouth once a day  -- Indication: For Hypothyroid    Multiple Vitamins oral tablet  -- 1 tab(s) by mouth once a day  -- Indication: For Supplement

## 2017-11-09 NOTE — DISCHARGE NOTE ADULT - CARE PROVIDER_API CALL
Ender Hill), Internal Medicine  [address] [place] [address]  Phone: [phone]  Fax: [phone] Ender Hill), Internal Medicine  [address] [place] [id number]  Phone: [phone]  Fax: [phone]    Derrick Caal), Internal Medicine  [address] [place] [address]  Phone: [phone]  Fax: [phone]

## 2017-11-09 NOTE — DISCHARGE NOTE ADULT - PLAN OF CARE
Resolution Continue synthroid Continue trazadone, paxil and doxepin Continue atorvastatin  Contine ezetimibe Continue iron supplementation at home Continue omeprazole Patient had endoscopy and colonoscopy. Colonoscopy was not completed due to multiple blood clots and diverticuli noted. Please follow up with GI, Dr. Caal, within 1 week.  Follow up with PCP, Dr. Hill, within 1 week for repeat CBC Continue atorvastatin  Continue ezetimibe Continue atorvastatin  Continue ezetimibe  Continue aspirin 81 mg with meals -Rectal bleed 2/2 Diverticulosis -resolved bleeding   -Patient had endoscopy and  Colonoscopy was not completed due to blood clots and multiple diverticula . Please follow up with GI, Dr. GILBERTO Caal, within 1 week.  Follow up with PCP, Dr. Hill, within 1 week for repeat CBC Continue Trazadone, Paxil and doxepin

## 2017-11-09 NOTE — DISCHARGE NOTE ADULT - CARE PLAN
Principal Discharge DX:	GI bleed  Goal:	Resolution  Secondary Diagnosis:	Hypothyroid  Instructions for follow-up, activity and diet:	Continue synthroid  Secondary Diagnosis:	Depression  Instructions for follow-up, activity and diet:	Continue trazadone, paxil and doxepin  Secondary Diagnosis:	Hypercholesteremia  Instructions for follow-up, activity and diet:	Continue atorvastatin  Contine ezetimibe  Secondary Diagnosis:	Iron deficiency anemia  Instructions for follow-up, activity and diet:	Continue iron supplementation at home  Secondary Diagnosis:	GERD (gastroesophageal reflux disease)  Instructions for follow-up, activity and diet:	Continue omeprazole Principal Discharge DX:	GI bleed  Goal:	Resolution  Instructions for follow-up, activity and diet:	Patient had endoscopy and colonoscopy. Colonoscopy was not completed due to multiple blood clots and diverticuli noted. Please follow up with GI, Dr. Caal, within 1 week.  Follow up with PCP, Dr. Hill, within 1 week for repeat CBC  Secondary Diagnosis:	Hypothyroid  Instructions for follow-up, activity and diet:	Continue synthroid  Secondary Diagnosis:	Depression  Instructions for follow-up, activity and diet:	Continue trazadone, paxil and doxepin  Secondary Diagnosis:	Hypercholesteremia  Instructions for follow-up, activity and diet:	Continue atorvastatin  Continue ezetimibe  Secondary Diagnosis:	Iron deficiency anemia  Instructions for follow-up, activity and diet:	Continue iron supplementation at home  Secondary Diagnosis:	GERD (gastroesophageal reflux disease)  Instructions for follow-up, activity and diet:	Continue omeprazole Principal Discharge DX:	GI bleed  Goal:	Resolution  Instructions for follow-up, activity and diet:	Patient had endoscopy and colonoscopy. Colonoscopy was not completed due to multiple blood clots and diverticuli noted. Please follow up with GI, Dr. Caal, within 1 week.  Follow up with PCP, Dr. Hill, within 1 week for repeat CBC  Secondary Diagnosis:	Hypothyroid  Instructions for follow-up, activity and diet:	Continue synthroid  Secondary Diagnosis:	Depression  Instructions for follow-up, activity and diet:	Continue trazadone, paxil and doxepin  Secondary Diagnosis:	Hypercholesteremia  Instructions for follow-up, activity and diet:	Continue atorvastatin  Continue ezetimibe  Continue aspirin 81 mg with meals  Secondary Diagnosis:	Iron deficiency anemia  Instructions for follow-up, activity and diet:	Continue iron supplementation at home  Secondary Diagnosis:	GERD (gastroesophageal reflux disease)  Instructions for follow-up, activity and diet:	Continue omeprazole Principal Discharge DX:	GI bleed  Goal:	Resolution  Instructions for follow-up, activity and diet:	-Rectal bleed 2/2 Diverticulosis -resolved bleeding   -Patient had endoscopy and  Colonoscopy was not completed due to blood clots and multiple diverticula . Please follow up with GI, Dr. GILBERTO Caal, within 1 week.  Follow up with PCP, Dr. Hill, within 1 week for repeat CBC  Secondary Diagnosis:	Hypothyroid  Instructions for follow-up, activity and diet:	Continue synthroid  Secondary Diagnosis:	Depression  Instructions for follow-up, activity and diet:	Continue Trazadone, Paxil and doxepin  Secondary Diagnosis:	Hypercholesteremia  Instructions for follow-up, activity and diet:	Continue atorvastatin  Continue ezetimibe  Continue aspirin 81 mg with meals  Secondary Diagnosis:	Iron deficiency anemia  Instructions for follow-up, activity and diet:	Continue iron supplementation at home  Secondary Diagnosis:	GERD (gastroesophageal reflux disease)  Instructions for follow-up, activity and diet:	Continue omeprazole

## 2017-11-09 NOTE — DISCHARGE NOTE ADULT - PATIENT PORTAL LINK FT
“You can access the [facility] Patient Portal, offered by [facility], by registering with the following website: [email or web address]”

## 2017-11-09 NOTE — PROGRESS NOTE ADULT - PROBLEM SELECTOR PLAN 2
On chronic iron supplementation at home.  Iron total 30  TIBC 205  Ferritin 47.0 Ac on chronic blood loss anemia 2/2 GI bleed On chronic iron supplementation at home .Anemia work up  Iron total 30  TIBC 205  Ferritin 47.0

## 2017-11-09 NOTE — PHYSICAL THERAPY INITIAL EVALUATION ADULT - PERTINENT HX OF CURRENT PROBLEM, REHAB EVAL
As per H&P:"87 yo F depression, anxiety, GERD, HLD, raynouds, iron deficiency anemia,  hypothyroid presents to ED with rectal bleeding. Pt's 3 daughters are at bedside. Pt states that 3-4 days ago, she noticed perfuse and persistent rectal bleeding. Had to change her diaper about 2-3 hours due to diaper being soaked with bloody diarrhea. Describes the diarrhea has dark, occasionally brown/red with black stools. Pt states that she has had many episodes like this in the past. Last time 4 y/o."

## 2017-11-09 NOTE — PHYSICAL THERAPY INITIAL EVALUATION ADULT - RANGE OF MOTION EXAMINATION, REHAB EVAL
right knee extension (-) 10 degrees from neutral, knee flexion ~95 degrees, arthritic changes noted t/o/bilateral lower extremity ROM was WFL (within functional limits)/deficits as listed below/bilateral upper extremity ROM was WFL (within functional limits)

## 2017-11-09 NOTE — PHYSICAL THERAPY INITIAL EVALUATION ADULT - GAIT DEVIATIONS NOTED, PT EVAL
decreased weight-shifting ability/balance fair /c rolling walker/decreased stride length/decreased peyton/decreased velocity of limb motion

## 2017-11-09 NOTE — DISCHARGE NOTE ADULT - ADDITIONAL INSTRUCTIONS
Follow up with Dr. Hill in 1 week after discharge. Follow up with PCP, Dr. Hill in 1 week after discharge.  Follow up with GI, Dr. Caal, within 1 week of discharge Follow up with PCP, Dr. Hill in 1 week after discharge.for CBC & CMP in 1week  Follow up with GI, Dr. Caal, within 1 week of discharge

## 2017-11-09 NOTE — GOALS OF CARE CONVERSATION - PERSONAL ADVANCE DIRECTIVE - CONVERSATION DETAILS
spoke to pt, Ankita, who is hcp, she will provide copy for  chart. Contact # given. pt has had discussions of her directives w family.

## 2017-11-09 NOTE — PHYSICAL THERAPY INITIAL EVALUATION ADULT - ADDITIONAL COMMENTS
Pt lives at home in a private house alone /c no steps to enter home but uses a ramp through the back of house. Pt has 2 steps inside to her den. Pt was independent /c all functional mobility and ADLs prior to admission. Pt does not drive and does have a rollator, wheelchair single axis cane and shower chair. Pt amb /c rollator prior to admission. Pt daughter comes in on Wednesday to assist pt /c showering and house chores. pt does dress, feed, cook and mobilize herself at her baseline.

## 2017-11-09 NOTE — PROGRESS NOTE ADULT - PROBLEM SELECTOR PLAN 1
FOBT positive, r/o upper GI bleed, 2/2 NSAIDS, ASA d/d gastritis, PUD  Protonix drip   1unit PRBC to be given x 1 today. Type and screen performed  CBC daily, No active bleeding  Hg 10.6, hct 33.6  S/p 1000 mL NaCL x 1 bolus  GI Dr. GILBERTO Caal consulted. F/u recs-EGD today  Carafate 1 g q6 hours  NPO  F/u PT eval FOBT positive, r/o upper GI bleed, 2/2 NSAIDS, ASA d/d gastritis, PUD  Protonix drip   1unit PRBC to be given x 1 today. Type and screen performed  CBC daily, No active bleeding  Hg 8.6, hct 26.4. Pt to receive 1 unit PRBC  S/p 1000 mL NaCL x 1 bolus  GI Dr. GILBERTO Caal consulted. F/u recs-EGD today  Carafate 1 g q6 hours  NPO  F/u PT eval FOBT positive, r/o upper GI bleed, 2/2 NSAIDS, ASA d/d gastritis, PUD  Protonix drip changed to 2x day  1unit PRBC to be given x 1 today. Type and screen performed  CBC daily, No active bleeding  Hg 8.6, hct 26.4. Pt to receive 1 unit PRBC    GI Dr. GILBERTO Caal consulted. F/u recs-EGD today  Carafate 1 g q6 hours  NPO  F/u PT eval

## 2017-11-09 NOTE — DISCHARGE NOTE ADULT - MEDICATION SUMMARY - MEDICATIONS TO STOP TAKING
I will STOP taking the medications listed below when I get home from the hospital:    Aspir 81 oral delayed release tablet  -- 1 tab(s) by mouth once a day I will STOP taking the medications listed below when I get home from the hospital:  None

## 2017-11-09 NOTE — DISCHARGE NOTE ADULT - HOSPITAL COURSE
87 yo F depression, anxiety, GERD, HLD, raynouds, iron deficiency anemia,  hypothyroid presents to ED with rectal bleeding. Pt's 3 daughters are at bedside. Pt states that 3-4 days ago, she noticed perfuse and persistent rectal bleeding. Had to change her diaper about 2-3 hours due to diaper being soaked with bloody diarrhea. Describes the diarrhea has dark, occasionally brown/red with black stools. Pt states that she has had many episodes like this in the past. Denies pain with any episodes. Last episode was early this year. Previous episode before that was about 5 years ago, but with less bleeding. 10 years ago, pt had episode with major bleeding where she had to go to [facility]. Pt states that episodes resolve on own after about 5 days. Pt unsure if shes ever gotten colonoscopy. Does not follow GI. Denies fevers, chills, nausea, vomiting, CP, SOB, abdominal pain, constipation, numbness, tingling. Denies sick contact, travel hx, changes in diet. Pt did not take medications this morning. In the ED, VS temp: 97.5, HR 87, /72, RR 15, SpO2 98, WBC 10.6, Hg 3.44, Hct 33.6, platelets 171, PT 11.1, INR 1.02, PTT 30.6, Na 140, K 4.4, Cl 106, CO2 25, BUN 33, Cr 0.72, alk phos 366, AST 35, ALT 38. Occult blood positive. CXR: no active infiltrates. EKG: NSR at 72 bpm. In the ED, received 1000ml NaCl0.9 % bolus x 1, Protonix 40 mg x 1 dose. Pt was started on carafate, protonix drip. Pt was given 1 unit PRBC due to low hemoglobin. EGD was performed, which showed no bleeding, normal results. Colonoscopy was performed which showed 87 yo F depression, anxiety, GERD, HLD, raynouds, iron deficiency anemia,  hypothyroid presents to ED with rectal bleeding. Pt's 3 daughters are at bedside. Pt states that 3-4 days ago, she noticed perfuse and persistent rectal bleeding. Had to change her diaper about 2-3 hours due to diaper being soaked with bloody diarrhea. Describes the diarrhea has dark, occasionally brown/red with black stools. Pt states that she has had many episodes like this in the past. Denies pain with any episodes. Last episode was early this year. Previous episode before that was about 5 years ago, but with less bleeding. 10 years ago, pt had episode with major bleeding where she had to go to [facility]. Pt states that episodes resolve on own after about 5 days. Pt unsure if shes ever gotten colonoscopy. Does not follow GI. Denies fevers, chills, nausea, vomiting, CP, SOB, abdominal pain, constipation, numbness, tingling. Denies sick contact, travel hx, changes in diet. Pt did not take medications this morning. In the ED, VS temp: 97.5, HR 87, /72, RR 15, SpO2 98, WBC 10.6, Hg 3.44, Hct 33.6, platelets 171, PT 11.1, INR 1.02, PTT 30.6, Na 140, K 4.4, Cl 106, CO2 25, BUN 33, Cr 0.72, alk phos 366, AST 35, ALT 38. Occult blood positive. CXR: no active infiltrates. EKG: NSR at 72 bpm. In the ED, received 1000ml NaCl0.9 % bolus x 1, Protonix 40 mg x 1 dose. Pt was started on carafate, protonix drip. Pt was given 1 unit PRBC due to low hemoglobin. EGD was performed, which showed no bleeding, normal results, likely lower GI bleed. Colonoscopy was performed which showed multiple large diverticuli and colonoscopy was not completed. CTA performed 87 yo F depression, anxiety, GERD, HLD, raynouds, iron deficiency anemia,  hypothyroid presents to ED with rectal bleeding. Pt's 3 daughters are at bedside. Pt states that 3-4 days ago, she noticed perfuse and persistent rectal bleeding. Had to change her diaper about 2-3 hours due to diaper being soaked with bloody diarrhea. Describes the diarrhea has dark, occasionally brown/red with black stools. Pt states that she has had many episodes like this in the past. Denies pain with any episodes. Last episode was early this year. Previous episode before that was about 5 years ago, but with less bleeding. 10 years ago, pt had episode with major bleeding where she had to go to [facility]. Pt states that episodes resolve on own after about 5 days. Pt unsure if shes ever gotten colonoscopy. Does not follow GI. Denies fevers, chills, nausea, vomiting, CP, SOB, abdominal pain, constipation, numbness, tingling. Denies sick contact, travel hx, changes in diet. Pt did not take medications this morning. In the ED, VS temp: 97.5, HR 87, /72, RR 15, SpO2 98, WBC 10.6, Hg 3.44, Hct 33.6, platelets 171, PT 11.1, INR 1.02, PTT 30.6, Na 140, K 4.4, Cl 106, CO2 25, BUN 33, Cr 0.72, alk phos 366, AST 35, ALT 38. Occult blood positive. CXR: no active infiltrates. EKG: NSR at 72 bpm. In the ED, received 1000ml NaCl0.9 % bolus x 1, Protonix 40 mg x 1 dose. Pt was started on carafate, protonix drip. Pt was given 1 unit PRBC due to low hemoglobin. EGD was performed, which showed no bleeding, normal results, likely lower GI bleed. Colonoscopy was performed which showed multiple large diverticuli and colonoscopy was not completed. CTA performed. 87 yo F depression, anxiety, GERD, HLD, raynouds, iron deficiency anemia,  hypothyroid presents to ED with rectal bleeding. Pt's 3 daughters are at bedside. Pt states that 3-4 days ago, she noticed perfuse and persistent rectal bleeding. Had to change her diaper about 2-3 hours due to diaper being soaked with bloody diarrhea. Describes the diarrhea has dark, occasionally brown/red with black stools. Pt states that she has had many episodes like this in the past. Denies pain with any episodes. Last episode was early this year. Previous episode before that was about 5 years ago, but with less bleeding. 10 years ago, pt had episode with major bleeding where she had to go to [facility]. Pt states that episodes resolve on own after about 5 days. Pt unsure if shes ever gotten colonoscopy. Does not follow GI. Denies fevers, chills, nausea, vomiting, CP, SOB, abdominal pain, constipation, numbness, tingling. Denies sick contact, travel hx, changes in diet. Pt did not take medications this morning. In the ED, VS temp: 97.5, HR 87, /72, RR 15, SpO2 98, WBC 10.6, Hg 3.44, Hct 33.6, platelets 171, PT 11.1, INR 1.02, PTT 30.6, Na 140, K 4.4, Cl 106, CO2 25, BUN 33, Cr 0.72, alk phos 366, AST 35, ALT 38. Occult blood positive. CXR: no active infiltrates. EKG: NSR at 72 bpm. In the ED, received 1000ml NaCl0.9 % bolus x 1, Protonix 40 mg x 1 dose. Pt was started on carafate, protonix drip. Pt was given 1 unit PRBC due to low hemoglobin. EGD was performed, which showed no bleeding, normal results, likely lower GI bleed. Colonoscopy was performed which showed multiple large diverticuli and colonoscopy was not completed. CTA performed and showed no active bleed. 89 yo F depression, anxiety, GERD, HLD, raynouds, iron deficiency anemia,  hypothyroid presents to ED with rectal bleeding. Pt's 3 daughters are at bedside. Pt states that 3-4 days ago, she noticed perfuse and persistent rectal bleeding. Had to change her diaper about 2-3 hours due to diaper being soaked with bloody diarrhea. Describes the diarrhea has dark, occasionally brown/red with black stools. Pt states that she has had many episodes like this in the past. Denies pain with any episodes. Last episode was early this year. Previous episode before that was about 5 years ago, but with less bleeding. 10 years ago, pt had episode with major bleeding where she had to go to [facility]. Pt states that episodes resolve on own after about 5 days. Pt unsure if shes ever gotten colonoscopy. Does not follow GI. Denies fevers, chills, nausea, vomiting, CP, SOB, abdominal pain, constipation, numbness, tingling. Denies sick contact, travel hx, changes in diet. Pt did not take medications this morning. In the ED, VS temp: 97.5, HR 87, /72, RR 15, SpO2 98, WBC 10.6, Hg 3.44, Hct 33.6, platelets 171, PT 11.1, INR 1.02, PTT 30.6, Na 140, K 4.4, Cl 106, CO2 25, BUN 33, Cr 0.72, alk phos 366, AST 35, ALT 38. Occult blood positive. CXR: no active infiltrates. EKG: NSR at 72 bpm. In the ED, received 1000ml NaCl0.9 % bolus x 1, Protonix 40 mg x 1 dose.     Patient evaluated by GI, Dr. Caal. Pt was started on carafate, protonix drip. Pt was given 1 unit PRBC due to low hemoglobin. EGD was performed, which showed no bleeding, normal results, likely lower GI bleed. Colonoscopy was performed which showed multiple large diverticuli and multiple blood clots, therefore colonoscopy was not completed. CTA performed and showed no active bleed. Dr. Rodriguez was consulted for surgery, no acute intervention at this time. Patient did not have any more episodes of GI bleed. Hgb remained stable. Patient medically optimized for discharge to home with home PT at this time with close follow up with GI and PCP. 89 yo F depression, anxiety, GERD, HLD, raynouds, iron deficiency anemia,  hypothyroid presents to ED with rectal bleeding. Pt's 3 daughters are at bedside. Pt states that 3-4 days ago, she noticed perfuse and persistent rectal bleeding. Had to change her diaper about 2-3 hours due to diaper being soaked with bloody diarrhea. Describes the diarrhea has dark, occasionally brown/red with black stools. Pt states that she has had many episodes like this in the past. Denies pain with any episodes. Last episode was early this year. Previous episode before that was about 5 years ago, but with less bleeding. 10 years ago, pt had episode with major bleeding where she had to go to [facility]. Pt states that episodes resolve on own after about 5 days. Pt unsure if shes ever gotten colonoscopy. Does not follow GI. Denies fevers, chills, nausea, vomiting, CP, SOB, abdominal pain, constipation, numbness, tingling. Denies sick contact, travel hx, changes in diet. Pt did not take medications this morning. In the ED, VS temp: 97.5, HR 87, /72, RR 15, SpO2 98, WBC 10.6, Hg 3.44, Hct 33.6, platelets 171, PT 11.1, INR 1.02, PTT 30.6, Na 140, K 4.4, Cl 106, CO2 25, BUN 33, Cr 0.72, alk phos 366, AST 35, ALT 38. Occult blood positive. CXR: no active infiltrates. EKG: NSR at 72 bpm. In the ED, received 1000ml NaCl0.9 % bolus x 1, Protonix 40 mg x 1 dose.     Patient evaluated by GI, Dr.M Caal. Pt was started on carafate, protonix drip for possible U GI Bleed 2/2 to ASA & Aleve/ NSAID use with dark stool. Pt was given 1 unit PRBC due to Ac Drop in hemoglobin. EGD was performed next day, which showed no bleeding, normal results, likely lower GI bleed. pt had H/H stable Colonoscopy was performed which showed multiple large diverticula and multiple blood clots, therefore colonoscopy was not completed. pt had some more blood TX & so  CTA  A/p performed and showed no active bleed. H/H remained stable Hypernatremia  2/2 to NPO & Colonoscopy prep was addressed with IV D5W, pt had anemia work up with Ac on chronic  Anemia 2/2 to BRBPR with GI Bleed. Dr. Rodriguez was consulted for surgery, no acute intervention at this time. Patient did not have any more episodes of GI bleed. Hgb remained stable.Pt started on PO diet, no more BM. Patient medically optimized for discharge to home with home PT at this time with close follow up with GI and PCP in 1 week.

## 2017-11-09 NOTE — DISCHARGE NOTE ADULT - NS AS ACTIVITY OBS
No Heavy lifting/straining/Bathing allowed/Showering allowed/Walking-Indoors allowed/Walking-Outdoors allowed

## 2017-11-09 NOTE — DISCHARGE NOTE ADULT - SECONDARY DIAGNOSIS.
Hypothyroid Depression Hypercholesteremia Iron deficiency anemia GERD (gastroesophageal reflux disease)

## 2017-11-09 NOTE — PHYSICAL THERAPY INITIAL EVALUATION ADULT - ORIENTATION, REHAB EVAL
however forgetful of date in the monht, pt is [place] and /c some slight delay in response/oriented to person, place, time and situation

## 2017-11-09 NOTE — PROGRESS NOTE ADULT - ASSESSMENT
87 yo F depression, anxiety, GERD, HLD, raynouds, iron deficiency anemia,  hypothyroid presents to ED with Emma. Admitted with possible upper GI bleed 87 yo F depression, anxiety, GERD, HLD, raynouds, iron deficiency anemia,  hypothyroid presents to ED with Emma. Admitted with Melena r/o upper GI bleed, Ac on chronic Anemia. 1 u pRBC today.  S/P EGD.

## 2017-11-09 NOTE — PHYSICAL THERAPY INITIAL EVALUATION ADULT - IMPAIRMENTS FOUND, PT EVAL
muscle strength/gait, locomotion, and balance/joint integrity and mobility/ROM/aerobic capacity/endurance

## 2017-11-09 NOTE — PHYSICAL THERAPY INITIAL EVALUATION ADULT - GENERAL OBSERVATIONS, REHAB EVAL
Pt rec'd in bed in 3 west /c NAD or c/o. Pt felt cold and preferred to stay in bed due to going for EGD. PT educated pt on role of PT and pt agreeable /c PT eval but wanted to be left back in bed afterwards.

## 2017-11-09 NOTE — PROGRESS NOTE ADULT - SUBJECTIVE AND OBJECTIVE BOX
Patient is a 88y old  Female who presents with a chief complaint of bleeding from rectum (08 Nov 2017 19:39)      INTERVAL HPI:  89 yo F depression, anxiety, GERD, HLD, raynouds, iron deficiency anemia,  hypothyroid presents to ED with rectal bleeding. Pt's 3 daughters are at bedside. Pt states that 3-4 days ago, she noticed perfuse and persistent rectal bleeding. Had to change her diaper about 2-3 hours due to diaper being soaked with bloody diarrhea. Describes the diarrhea has dark, occasionally brown/red with black stools. Pt states that she has had many episodes like this in the past. Denies pain with any episodes. Last episode was early this year. Previous episode before that was about 5 years ago, but with less bleeding. 10 years ago, pt had episode with major bleeding where she had to go to [facility]. Pt states that episodes resolve on own after about 5 days. Pt unsure if shes ever gotten colonoscopy. Does not follow GI. Denies fevers, chills, nausea, vomiting, CP, SOB, abdominal pain, constipation, numbness, tingling. Denies sick contact, travel hx, changes in diet. Pt did not take medications this morning. In the ED, VS temp: 97.5, HR 87, /72, RR 15, SpO2 98, WBC 10.6, Hg 3.44, Hct 33.6, platelets 171, PT 11.1, INR 1.02, PTT 30.6, Na 140, K 4.4, Cl 106, CO2 25, BUN 33, Cr 0.72, alk phos 366, AST 35, ALT 38. Occult blood positive. CXR: no active infiltrates. EKG: NSR at 72 bpm. In the ED, received 1000ml NaCl0.9 % bolus x 1, Protonix 40 mg x 1 dose.    Pt seen and examined at bedside this morning. Pt is NPO to get EGD today. Will also get 1 unit PRBC. Pt states that she had a few episodes of diarrhea last night. Denies all other complaints.    OVERNIGHT EVENTS:    Home Medications:  Aspir 81 oral delayed release tablet: 1 tab(s) orally once a day (08 Nov 2017 19:59)  atorvastatin 10 mg oral tablet: 1 tab(s) orally once a day (08 Nov 2017 19:59)  doxepin: 10 milligram(s) orally once a day (08 Nov 2017 19:59)  ezetimibe: 10 milligram(s) orally once a day (08 Nov 2017 19:59)  Iron 100 Plus oral tablet: 1 cap(s) orally every other day (08 Nov 2017 19:59)  Multiple Vitamins oral tablet: 1 tab(s) orally once a day (08 Nov 2017 19:59)  omeprazole: 40 milligram(s) orally once a day (08 Nov 2017 19:59)  PARoxetine 20 mg oral tablet: 2.5 tab(s) orally once a day (08 Nov 2017 19:59)  Synthroid 25 mcg (0.025 mg) oral tablet: 1 tab(s) orally once a day (08 Nov 2017 19:59)  traZODone: 50 milligram(s) orally once a day (at bedtime) (08 Nov 2017 19:59)      MEDICATIONS  (STANDING):  atorvastatin 10 milliGRAM(s) Oral at bedtime  levothyroxine 25 MICROGram(s) Oral daily  multivitamin 1 Tablet(s) Oral daily  pantoprazole Infusion 8 mG/Hr (10 mL/Hr) IV Continuous <Continuous>  PARoxetine 10 milliGRAM(s) Oral daily  sucralfate suspension 1 Gram(s) Oral every 6 hours  traZODone 50 milliGRAM(s) Oral at bedtime    MEDICATIONS  (PRN):      Allergies    No Known Allergies    Intolerances        REVIEW OF SYSTEMS:  CONSTITUTIONAL: No fever, No chills, No fatigue, No myalgia, No Body ache, No Weakness  EYES: No eye pain,  No visual disturbances, No discharge, NO Redness  ENMT:  No ear pain, No nose bleed, No vertigo; No sinus or throat pain, No Congestion  NECK: No pain, No stiffness  RESPIRATORY: No cough, wheezing, No  hemoptysis, No shortness of breath  CARDIOVASCULAR: No chest pain, palpitations  GASTROINTESTINAL: diarrhea, nonbloody, No abdominal or epigastric pain. No nausea, No vomiting; No constipation. [ x ] BM  GENITOURINARY: No dysuria, No frequency, No urgency, No hematuria, or incontinence  NEUROLOGICAL: No headaches, No dizziness, No numbness, No tingling, No tremors, No weakness  EXT: No Swelling, No Pain, No Edema  SKIN:  [ x ] No itching, burning, rashes, or lesions   MUSCULOSKELETAL: No joint pain or swelling; No muscle pain, No back pain, No extremity pain  PSYCHIATRIC: No depression, anxiety, mood swings or difficulty sleeping at night  PAIN SCALE: [ x ] None  [  ] Other-  ROS Unable to obtain due to - [  ] Dementia  [  ] Lethargy  [  ] Sedated [  ] non verbal  REST OF REVIEW Of SYSTEM - [ x ] Normal     Vital Signs Last 24 Hrs  T(C): 36.3 (09 Nov 2017 10:14), Max: 36.9 (08 Nov 2017 21:38)  T(F): 97.4 (09 Nov 2017 10:14), Max: 98.4 (08 Nov 2017 21:38)  HR: 73 (09 Nov 2017 10:14) (73 - 82)  BP: 146/77 (09 Nov 2017 10:14) (124/69 - 153/70)  BP(mean): --  RR: 16 (09 Nov 2017 10:14) (15 - 17)  SpO2: 96% (09 Nov 2017 10:14) (96% - 98%)  Finger Stick        11-08 @ 07:01  -  11-09 @ 07:00  --------------------------------------------------------  IN: 100 mL / OUT: 0 mL / NET: 100 mL        PHYSICAL EXAM:  GENERAL:  [ x ] NAD , [ x ] well appearing, [  ] Agitated, [  ] Drowsy,  [  ] Lethargy, [  ] confused   HEAD:  [ x ] Normal, [  ] Other  EYES:  [ x ] EOMI, [ x ] PERRLA, [ x ] conjunctiva and sclera clear normal, [  ] Other,  [  ] Pallor,[  ] Discharge  ENMT:  [ x ] Normal, [ x ] Moist mucous membranes, [  ] Good dentition, [  ] No Thrush  NECK:  [x  ] Supple, [  ] No JVD, [  ] Normal thyroid, [  ] Lymphadenopathy [  ] Other  CHEST/LUNG:  [ x ] Clear to auscultation bilaterally, [  ] Breath Sounds equal OR Decrease,  [ x ] No rales, [ x] No rhonchi  [x  ]  No wheezing  HEART:  [ x ] Regular rate and rhythm, [  ] tachycardia, [  ] Bradycardia,  [  ] irregular  [ x ] No murmurs, No rubs, No gallops, [  ] PPM in place (Mfr:  )  ABDOMEN:  [ x ] Soft, [ x ] Nontender, [ x ] Nondistended, [ x ] No mass, [ x ] Bowel sounds present, [  ] obese  NERVOUS SYSTEM:  [ x ] Alert & Oriented X3, [  ] Nonfocal  [  ] Confusion  [  ] Encephalopathic [  ] Sedated [  ] Unable to assess, [  ] Other-  EXTREMITIES: stasis skin changes b/l legs [  ] 2+ Peripheral Pulses, No clubbing, No cyanosis,  [ x ] edema B/L lower EXT, +1 nonpitting [ x ] PVD stasis skin changes B/L Lower EXT  LYMPH: No lymphadenopathy noted  SKIN:  [  ] No rashes or lesions, [  ] Pressure Ulcers, [  ] ecchymosis, [  ] Skin Tears, [x ] Other PVD stasis skin changes B/L Lower EXT      DIET:     LABS:                        8.6    8.0   )-----------( 163      ( 09 Nov 2017 07:15 )             26.4     09 Nov 2017 07:15    147    |  114    |  20     ----------------------------<  76     4.1     |  25     |  0.51     Ca    8.5        09 Nov 2017 07:15    TPro  6.6    /  Alb  3.2    /  TBili  0.5    /  DBili  x      /  AST  35     /  ALT  38     /  AlkPhos  366    08 Nov 2017 12:01    PT/INR - ( 08 Nov 2017 12:01 )   PT: 11.1 sec;   INR: 1.02 ratio         PTT - ( 08 Nov 2017 12:01 )  PTT:30.6 sec                         Anemia Panel:  Iron Total, Serum: 30 ug/dL (11-09-17 @ 08:11)  Iron - Total Binding Capacity.: 205 ug/dL (11-09-17 @ 08:11)  Ferritin, Serum: 47.0 ng/mL (11-09-17 @ 08:11)  Absolute Reticulocytes: 78.8 K/uL (11-09-17 @ 08:11)  Vitamin B12, Serum: 1096 pg/mL (11-09-17 @ 08:11)  Folate, Serum: >20.0 ng/mL (11-09-17 @ 08:11)      Thyroid Panel:  T4, Serum: 7.3 ug/dL (11-09-17 @ 08:11)  Thyroid Stimulating Hormone, Serum: 3.83 uIU/mL (11-09-17 @ 07:15)                RADIOLOGY & ADDITIONAL TESTS:      HEALTH ISSUES - PROBLEM Dx:  Melena: Melena  Need for prophylactic measure: Need for prophylactic measure  Hypothyroid: Hypothyroid  Hypercholesteremia: Hypercholesteremia  GERD (gastroesophageal reflux disease): GERD (gastroesophageal reflux disease)  Anxiety: Anxiety  Iron deficiency anemia: Iron deficiency anemia  GI bleed: GI bleed          Consultant(s) Notes Reviewed:  [  ] YES     Care Discussed with [X] Consultants  [  ] Patient  [  ] Family  [  ]   [  ] Social Service  [  ] RN, [  ] Physical Therapy  DVT PPX: [  ] Lovenox, [  ] S C Heparin, [  ] Coumadin, [  ] Xarelto, [  ] Eliquis, [  ] Pradaxa, [  ] IV Heparin drip, [  ] SCD [  ] Contraindication 2 to GI Bleed,[  ] Ambulation  Advanced directive: [  ] None, [  ] DNR/DNI Patient is a 88y old  Female who presents with a chief complaint of bleeding from rectum (08 Nov 2017 19:39)      INTERVAL HPI:  87 yo F depression, anxiety, GERD, HLD, raynouds, iron deficiency anemia,  hypothyroid presents to ED with rectal bleeding. Pt's 3 daughters are at bedside. Pt states that 3-4 days ago, she noticed perfuse and persistent rectal bleeding. Had to change her diaper about 2-3 hours due to diaper being soaked with bloody diarrhea. Describes the diarrhea has dark, occasionally brown/red with black stools. Pt states that she has had many episodes like this in the past. Denies pain with any episodes. Last episode was early this year. Previous episode before that was about 5 years ago, but with less bleeding. 10 years ago, pt had episode with major bleeding where she had to go to [facility]. Pt states that episodes resolve on own after about 5 days. Pt unsure if shes ever gotten colonoscopy. Does not follow GI. Denies fevers, chills, nausea, vomiting, CP, SOB, abdominal pain, constipation, numbness, tingling. Denies sick contact, travel hx, changes in diet. Pt did not take medications this morning. In the ED, VS temp: 97.5, HR 87, /72, RR 15, SpO2 98, WBC 10.6, Hg 3.44, Hct 33.6, platelets 171, PT 11.1, INR 1.02, PTT 30.6, Na 140, K 4.4, Cl 106, CO2 25, BUN 33, Cr 0.72, alk phos 366, AST 35, ALT 38. Occult blood positive. CXR: no active infiltrates. EKG: NSR at 72 bpm. In the ED, received 1000ml NaCl0.9 % bolus x 1, Protonix 40 mg x 1 dose.    Pt seen and examined at bedside this morning. Pt is NPO to get EGD today. Will also get 1 unit PRBC for drop of H/H. Pt states that she had a few episodes of diarrhea last night. Denies all other complaints.    OVERNIGHT EVENTS: dark BM yesterday    Home Medications:  Aspir 81 oral delayed release tablet: 1 tab(s) orally once a day (08 Nov 2017 19:59)  atorvastatin 10 mg oral tablet: 1 tab(s) orally once a day (08 Nov 2017 19:59)  doxepin: 10 milligram(s) orally once a day (08 Nov 2017 19:59)  ezetimibe: 10 milligram(s) orally once a day (08 Nov 2017 19:59)  Iron 100 Plus oral tablet: 1 cap(s) orally every other day (08 Nov 2017 19:59)  Multiple Vitamins oral tablet: 1 tab(s) orally once a day (08 Nov 2017 19:59)  omeprazole: 40 milligram(s) orally once a day (08 Nov 2017 19:59)  PARoxetine 20 mg oral tablet: 2.5 tab(s) orally once a day (08 Nov 2017 19:59)  Synthroid 25 mcg (0.025 mg) oral tablet: 1 tab(s) orally once a day (08 Nov 2017 19:59)  traZODone: 50 milligram(s) orally once a day (at bedtime) (08 Nov 2017 19:59)      MEDICATIONS  (STANDING):  atorvastatin 10 milliGRAM(s) Oral at bedtime  levothyroxine 25 MICROGram(s) Oral daily  multivitamin 1 Tablet(s) Oral daily  pantoprazole Infusion 8 mG/Hr (10 mL/Hr) IV Continuous <Continuous>  PARoxetine 10 milliGRAM(s) Oral daily  sucralfate suspension 1 Gram(s) Oral every 6 hours  traZODone 50 milliGRAM(s) Oral at bedtime    MEDICATIONS  (PRN):      Allergies    No Known Allergies    Intolerances    REVIEW OF SYSTEMS:  CONSTITUTIONAL: No fever, No chills, No fatigue, No myalgia, No Body ache, No Weakness  EYES: No eye pain,  No visual disturbances, No discharge, NO Redness  ENMT:  No ear pain, No nose bleed, No vertigo; No sinus or throat pain, No Congestion  NECK: No pain, No stiffness  RESPIRATORY: No cough, wheezing, No  hemoptysis, No shortness of breath  CARDIOVASCULAR: No chest pain, palpitations  GASTROINTESTINAL: diarrhea, nonbloody, No abdominal or epigastric pain. No nausea, No vomiting; No constipation. [ x ] BM  GENITOURINARY: No dysuria, No frequency, No urgency, No hematuria, or incontinence  NEUROLOGICAL: No headaches, No dizziness, No numbness, No tingling, No tremors, No weakness  EXT: No Swelling, No Pain, No Edema  SKIN:  [ x ] No itching, burning, rashes, or lesions   MUSCULOSKELETAL: No joint pain or swelling; No muscle pain, No back pain, No extremity pain  PSYCHIATRIC: No depression, anxiety, mood swings or difficulty sleeping at night  PAIN SCALE: [ x ] None  [  ] Other-  ROS Unable to obtain due to - [  ] Dementia  [  ] Lethargy  [  ] Sedated [  ] non verbal  REST OF REVIEW Of SYSTEM - [ x ] Normal     Vital Signs Last 24 Hrs  T(C): 36.3 (09 Nov 2017 10:14), Max: 36.9 (08 Nov 2017 21:38)  T(F): 97.4 (09 Nov 2017 10:14), Max: 98.4 (08 Nov 2017 21:38)  HR: 73 (09 Nov 2017 10:14) (73 - 82)  BP: 146/77 (09 Nov 2017 10:14) (124/69 - 153/70)  BP(mean): --  RR: 16 (09 Nov 2017 10:14) (15 - 17)  SpO2: 96% (09 Nov 2017 10:14) (96% - 98%)  Finger Stick        11-08 @ 07:01  -  11-09 @ 07:00  --------------------------------------------------------  IN: 100 mL / OUT: 0 mL / NET: 100 mL        PHYSICAL EXAM:  GENERAL:  [ x ] NAD , [ x ] well appearing, [  ] Agitated, [  ] Drowsy,  [  ] Lethargy, [  ] confused   HEAD:  [ x ] Normal, [  ] Other  EYES:  [ x ] EOMI, [ x ] PERRLA, [ x ] conjunctiva and sclera clear normal, [  ] Other,  [x  ] Pallor,[  ] Discharge  ENMT:  [ x ] Normal, [ x ] Moist mucous membranes, [x  ] Good dentition, [ x ] No Thrush  NECK:  [x  ] Supple, [  x] No JVD, [x  ] Normal thyroid, [  ] Lymphadenopathy [  ] Other  CHEST/LUNG:  [ x ] Clear to auscultation bilaterally, [ x ] Breath Sounds equal   [ x ] No rales, [ x] No rhonchi  [x  ]  No wheezing  HEART:  [ x ] Regular rate and rhythm, [  ] tachycardia, [  ] Bradycardia,  [  ] irregular  [ x ] No murmurs, No rubs, No gallops, [  ] PPM in place (Mfr:  )  ABDOMEN:  [ x ] Soft, [ x ] Nontender, [ x ] Ascites distended, [ x ] No mass, [ x ] Bowel sounds present, [  ] obese  NERVOUS SYSTEM:  [ x ] Alert & Pablo, [ x ] Nonfocal  [  ] Confusion  [  ] Encephalopathic [  ] Sedated [  ] Unable to assess, [  ] Other-  EXTREMITIES: stasis skin changes b/l legs [  ] 2+ Peripheral Pulses, No clubbing, No cyanosis,  [ x ] edema B/L lower EXT, +1 nonpitting [ x ] PVD stasis skin changes B/L Lower EXT  LYMPH: No lymphadenopathy noted  SKIN:  [  ] No rashes or lesions, [  ] Pressure Ulcers, [  ] ecchymosis, [  ] Skin Tears, [x ] Other PVD stasis skin changes B/L Lower EXT    DIET: Regular    LABS:                        8.6    8.0   )-----------( 163      ( 09 Nov 2017 07:15 )             26.4     09 Nov 2017 07:15    147    |  114    |  20     ----------------------------<  76     4.1     |  25     |  0.51     Ca    8.5        09 Nov 2017 07:15    TPro  6.6    /  Alb  3.2    /  TBili  0.5    /  DBili  x      /  AST  35     /  ALT  38     /  AlkPhos  366    08 Nov 2017 12:01    PT/INR - ( 08 Nov 2017 12:01 )   PT: 11.1 sec;   INR: 1.02 ratio         PTT - ( 08 Nov 2017 12:01 )  PTT:30.6 sec    Anemia Panel:  Iron Total, Serum: 30 ug/dL (11-09-17 @ 08:11)  Iron - Total Binding Capacity.: 205 ug/dL (11-09-17 @ 08:11)  Ferritin, Serum: 47.0 ng/mL (11-09-17 @ 08:11)  Absolute Reticulocytes: 78.8 K/uL (11-09-17 @ 08:11)  Vitamin B12, Serum: 1096 pg/mL (11-09-17 @ 08:11)  Folate, Serum: >20.0 ng/mL (11-09-17 @ 08:11)      Thyroid Panel:  T4, Serum: 7.3 ug/dL (11-09-17 @ 08:11)  Thyroid Stimulating Hormone, Serum: 3.83 uIU/mL (11-09-17 @ 07:15)    HEALTH ISSUES - PROBLEM Dx:  Melena: Melena  Need for prophylactic measure: Need for prophylactic measure  Hypothyroid: Hypothyroid  Hypercholesteremia: Hypercholesteremia  GERD (gastroesophageal reflux disease): GERD (gastroesophageal reflux disease)  Anxiety: Anxiety  Iron deficiency anemia: Iron deficiency anemia  GI bleed: GI bleed          Consultant(s) Notes Reviewed:  [ x ] YES     Care Discussed with [X] Consultants  [ x ] Patient  [  ] Family  [ x ]   [x  ] Social Service  [ x ] RN, [  ] Physical Therapy  DVT PPX: [  ] Lovenox, [x  ] S C Heparin, [  ] Coumadin, [  ] Xarelto, [  ] Eliquis, [  ] Pradaxa, [  ] IV Heparin drip, [  ] SCD [  ] Contraindication 2 to GI Bleed,[  ] Ambulation  Advanced directive: [x  ] None, [  ] DNR/DNI

## 2017-11-10 ENCOUNTER — TRANSCRIPTION ENCOUNTER (OUTPATIENT)
Age: 82
End: 2017-11-10

## 2017-11-10 DIAGNOSIS — D64.9 ANEMIA, UNSPECIFIED: ICD-10-CM

## 2017-11-10 DIAGNOSIS — E87.0 HYPEROSMOLALITY AND HYPERNATREMIA: ICD-10-CM

## 2017-11-10 LAB
ANION GAP SERPL CALC-SCNC: 9 MMOL/L — SIGNIFICANT CHANGE UP (ref 5–17)
BUN SERPL-MCNC: 14 MG/DL — SIGNIFICANT CHANGE UP (ref 7–23)
CALCIUM SERPL-MCNC: 8.9 MG/DL — SIGNIFICANT CHANGE UP (ref 8.5–10.1)
CHLORIDE SERPL-SCNC: 124 MMOL/L — HIGH (ref 96–108)
CO2 SERPL-SCNC: 24 MMOL/L — SIGNIFICANT CHANGE UP (ref 22–31)
CREAT SERPL-MCNC: 0.63 MG/DL — SIGNIFICANT CHANGE UP (ref 0.5–1.3)
GLUCOSE SERPL-MCNC: 97 MG/DL — SIGNIFICANT CHANGE UP (ref 70–99)
HCT VFR BLD CALC: 34.7 % — SIGNIFICANT CHANGE UP (ref 34.5–45)
HCT VFR BLD CALC: 35.2 % — SIGNIFICANT CHANGE UP (ref 34.5–45)
HCT VFR BLD CALC: 35.6 % — SIGNIFICANT CHANGE UP (ref 34.5–45)
HGB BLD-MCNC: 11 G/DL — LOW (ref 11.5–15.5)
HGB BLD-MCNC: 11.1 G/DL — LOW (ref 11.5–15.5)
HGB BLD-MCNC: 11.3 G/DL — LOW (ref 11.5–15.5)
MCHC RBC-ENTMCNC: 30.1 PG — SIGNIFICANT CHANGE UP (ref 27–34)
MCHC RBC-ENTMCNC: 30.4 PG — SIGNIFICANT CHANGE UP (ref 27–34)
MCHC RBC-ENTMCNC: 31.6 GM/DL — LOW (ref 32–36)
MCHC RBC-ENTMCNC: 31.7 GM/DL — LOW (ref 32–36)
MCV RBC AUTO: 95.5 FL — SIGNIFICANT CHANGE UP (ref 80–100)
MCV RBC AUTO: 95.9 FL — SIGNIFICANT CHANGE UP (ref 80–100)
PLATELET # BLD AUTO: 171 K/UL — SIGNIFICANT CHANGE UP (ref 150–400)
PLATELET # BLD AUTO: 183 K/UL — SIGNIFICANT CHANGE UP (ref 150–400)
POTASSIUM SERPL-MCNC: 3.7 MMOL/L — SIGNIFICANT CHANGE UP (ref 3.5–5.3)
POTASSIUM SERPL-SCNC: 3.7 MMOL/L — SIGNIFICANT CHANGE UP (ref 3.5–5.3)
RBC # BLD: 3.64 M/UL — LOW (ref 3.8–5.2)
RBC # BLD: 3.71 M/UL — LOW (ref 3.8–5.2)
RBC # FLD: 15 % — HIGH (ref 10.3–14.5)
RBC # FLD: 15.2 % — HIGH (ref 10.3–14.5)
SODIUM SERPL-SCNC: 153 MMOL/L — HIGH (ref 135–145)
SODIUM SERPL-SCNC: 157 MMOL/L — HIGH (ref 135–145)
WBC # BLD: 11.3 K/UL — HIGH (ref 3.8–10.5)
WBC # BLD: 8.5 K/UL — SIGNIFICANT CHANGE UP (ref 3.8–10.5)
WBC # FLD AUTO: 11.3 K/UL — HIGH (ref 3.8–10.5)
WBC # FLD AUTO: 8.5 K/UL — SIGNIFICANT CHANGE UP (ref 3.8–10.5)

## 2017-11-10 PROCEDURE — 74174 CTA ABD&PLVS W/CONTRAST: CPT | Mod: 26

## 2017-11-10 RX ORDER — SODIUM CHLORIDE 9 MG/ML
1000 INJECTION, SOLUTION INTRAVENOUS
Qty: 0 | Refills: 0 | Status: DISCONTINUED | OUTPATIENT
Start: 2017-11-10 | End: 2017-11-11

## 2017-11-10 RX ORDER — ASPIRIN/CALCIUM CARB/MAGNESIUM 324 MG
1 TABLET ORAL
Qty: 0 | Refills: 0 | COMMUNITY

## 2017-11-10 RX ORDER — PANTOPRAZOLE SODIUM 20 MG/1
1 TABLET, DELAYED RELEASE ORAL
Qty: 0 | Refills: 0 | COMMUNITY
Start: 2017-11-10

## 2017-11-10 RX ORDER — SODIUM CHLORIDE 9 MG/ML
1000 INJECTION, SOLUTION INTRAVENOUS
Qty: 0 | Refills: 0 | COMMUNITY
Start: 2017-11-10

## 2017-11-10 RX ORDER — SUCRALFATE 1 G
10 TABLET ORAL
Qty: 0 | Refills: 0 | COMMUNITY
Start: 2017-11-10

## 2017-11-10 RX ADMIN — Medication 1000 MILLILITER(S): at 00:01

## 2017-11-10 RX ADMIN — Medication 50 MILLIGRAM(S): at 22:01

## 2017-11-10 RX ADMIN — ATORVASTATIN CALCIUM 10 MILLIGRAM(S): 80 TABLET, FILM COATED ORAL at 22:01

## 2017-11-10 RX ADMIN — SODIUM CHLORIDE 75 MILLILITER(S): 9 INJECTION, SOLUTION INTRAVENOUS at 08:32

## 2017-11-10 NOTE — PROGRESS NOTE ADULT - PROBLEM SELECTOR PLAN 4
Continue Trazadone, Paxil  Pt can bring doxepin from home due to pharmacy not having it acute on chronic 2/2 ac GI Blood loss anemia  AAnemia work up done  Colonoscopy today acute on chronic 2/2 ac GI Blood loss anemia  Anemia work up done  Colonoscopy today

## 2017-11-10 NOTE — CONSULT NOTE ADULT - SUBJECTIVE AND OBJECTIVE BOX
History of Present Illness:   87 yo F depression, anxiety, GERD, HLD, raynauds, iron deficiency anemia,  hypothyroidism, has had blood per rectum daily starting 5-6 days ago.   No pain.  Admitted 2 days ago.  EGD negative. CT angiogram negative for bleeding today.    PSH: ZOILA, Lap gaetano, ORIF ankle, bilateral TKR    PE  normal abdominal exam with no mass or tenderness    Labs noted:      Will stand by.  With negative CT angiogram hopefully she will not rebleed.  If she rebleeds may need to be transferred  for IR embolization

## 2017-11-10 NOTE — PROGRESS NOTE ADULT - SUBJECTIVE AND OBJECTIVE BOX
Patient is a 88y old  Female who presents with a chief complaint of bleeding from rectum (09 Nov 2017 14:21)    INTERVAL HPI:  89 yo F depression, anxiety, GERD, HLD, raynouds, iron deficiency anemia,  hypothyroid presents to ED with rectal bleeding. Pt's 3 daughters are at bedside. Pt states that 3-4 days ago, she noticed perfuse and persistent rectal bleeding. Had to change her diaper about 2-3 hours due to diaper being soaked with bloody diarrhea. Describes the diarrhea has dark, occasionally brown/red with black stools. Pt states that she has had many episodes like this in the past. Denies pain with any episodes. Last episode was early this year. Previous episode before that was about 5 years ago, but with less bleeding. 10 years ago, pt had episode with major bleeding where she had to go to [facility]. Pt states that episodes resolve on own after about 5 days. Pt unsure if shes ever gotten colonoscopy. Does not follow GI. Denies fevers, chills, nausea, vomiting, CP, SOB, abdominal pain, constipation, numbness, tingling. Denies sick contact, travel hx, changes in diet. Pt did not take medications this morning. In the ED, VS temp: 97.5, HR 87, /72, RR 15, SpO2 98, WBC 10.6, Hg 3.44, Hct 33.6, platelets 171, PT 11.1, INR 1.02, PTT 30.6, Na 140, K 4.4, Cl 106, CO2 25, BUN 33, Cr 0.72, alk phos 366, AST 35, ALT 38. Occult blood positive. CXR: no active infiltrates. EKG: NSR at 72 bpm. In the ED, received 1000ml NaCl0.9 % bolus x 1, Protonix 40 mg x 1 dose. Pt s/p 1 unit PRBC, s/p EDG which was negative for upper GI bleed.     Pt seen and examined at bedside this morning. Repeat H/H was performed due to bright red blood found in diaper this am. Pt is NPO to get colonoscopy today. Denies all other complaints.    OVERNIGHT EVENTS: None.     Home Medications:  Aspir 81 oral delayed release tablet: 1 tab(s) orally once a day (08 Nov 2017 19:59)  atorvastatin 10 mg oral tablet: 1 tab(s) orally once a day (08 Nov 2017 19:59)  doxepin: 10 milligram(s) orally once a day (08 Nov 2017 19:59)  ezetimibe: 10 milligram(s) orally once a day (08 Nov 2017 19:59)  Iron 100 Plus oral tablet: 1 cap(s) orally every other day (08 Nov 2017 19:59)  Multiple Vitamins oral tablet: 1 tab(s) orally once a day (08 Nov 2017 19:59)  omeprazole: 40 milligram(s) orally once a day (08 Nov 2017 19:59)  PARoxetine 20 mg oral tablet: 2.5 tab(s) orally once a day (08 Nov 2017 19:59)  Synthroid 25 mcg (0.025 mg) oral tablet: 1 tab(s) orally once a day (08 Nov 2017 19:59)  traZODone: 50 milligram(s) orally once a day (at bedtime) (08 Nov 2017 19:59)      MEDICATIONS  (STANDING):  atorvastatin 10 milliGRAM(s) Oral at bedtime  dextrose 5%. 1000 milliLiter(s) (75 mL/Hr) IV Continuous <Continuous>  levothyroxine 25 MICROGram(s) Oral daily  multivitamin 1 Tablet(s) Oral daily  pantoprazole    Tablet 40 milliGRAM(s) Oral two times a day before meals  PARoxetine 10 milliGRAM(s) Oral daily  sucralfate suspension 1 Gram(s) Oral every 6 hours  traZODone 50 milliGRAM(s) Oral at bedtime    MEDICATIONS  (PRN):      Allergies    No Known Allergies    Intolerances        REVIEW OF SYSTEMS:  CONSTITUTIONAL: No fever, No chills, No fatigue, No myalgia, No Body ache, No Weakness  EYES: No eye pain,  No visual disturbances, No discharge, NO Redness  ENMT:  No ear pain, No nose bleed, No vertigo; No sinus or throat pain, No Congestion  NECK: No pain, No stiffness  RESPIRATORY: No cough, wheezing, No  hemoptysis, No shortness of breath  CARDIOVASCULAR: No chest pain, palpitations  GASTROINTESTINAL: bloody diarrhea,  No abdominal or epigastric pain. No nausea, No vomiting; No constipation. [ x ] BM  GENITOURINARY: No dysuria, No frequency, No urgency, No hematuria, or incontinence  NEUROLOGICAL: No headaches, No dizziness, No numbness, No tingling, No tremors, No weakness  EXT: No Swelling, No Pain, No Edema  SKIN:  [ x ] No itching, burning, rashes, or lesions   MUSCULOSKELETAL: No joint pain or swelling; No muscle pain, No back pain, No extremity pain  PSYCHIATRIC: No depression, anxiety, mood swings or difficulty sleeping at night  PAIN SCALE: [ x ] None  [  ] Other-  ROS Unable to obtain due to - [  ] Dementia  [  ] Lethargy  [  ] Sedated [  ] non verbal  REST OF REVIEW Of SYSTEM - [ x ] Normal     Vital Signs Last 24 Hrs  T(C): 37.2 (10 Nov 2017 05:06), Max: 37.2 (10 Nov 2017 05:06)  T(F): 98.9 (10 Nov 2017 05:06), Max: 98.9 (10 Nov 2017 05:06)  HR: 84 (10 Nov 2017 05:06) (72 - 88)  BP: 157/71 (10 Nov 2017 05:06) (129/53 - 157/71)  BP(mean): --  RR: 15 (10 Nov 2017 05:06) (15 - 18)  SpO2: 97% (10 Nov 2017 05:06) (93% - 100%)  Finger Stick          PHYSICAL EXAM:  GENERAL:  [ x ] NAD , [ x ] well appearing, [  ] Agitated, [  ] Drowsy,  [  ] Lethargy, [  ] confused   HEAD:  [ x ] Normal, [  ] Other  EYES:  [ x ] EOMI, [ x ] PERRLA, [ x ] conjunctiva and sclera clear normal, [  ] Other,  [x  ] Pallor,[  ] Discharge  ENMT:  [ x ] Normal, [ x ] Moist mucous membranes, [x  ] Good dentition, [ x ] No Thrush  NECK:  [x  ] Supple, [  x] No JVD, [x  ] Normal thyroid, [  ] Lymphadenopathy [  ] Other  CHEST/LUNG:  [ x ] Clear to auscultation bilaterally, [ x ] Breath Sounds equal   [ x ] No rales, [ x] No rhonchi  [x  ]  No wheezing  HEART:  [ x ] Regular rate and rhythm, [  ] tachycardia, [  ] Bradycardia,  [  ] irregular  [ x ] No murmurs, No rubs, No gallops, [  ] PPM in place (Mfr:  )  ABDOMEN:  [ x ] Soft, [ x ] Nontender, [ x ] Ascites distended, [ x ] No mass, [ x ] Bowel sounds present, [  ] obese  NERVOUS SYSTEM:  [ x ] Alert & Pablo, [ x ] Nonfocal  [  ] Confusion  [  ] Encephalopathic [  ] Sedated [  ] Unable to assess, [  ] Other-  EXTREMITIES: stasis skin changes b/l legs [  ] 2+ Peripheral Pulses, No clubbing, No cyanosis,  [ x ] edema B/L lower EXT, +1 nonpitting [ x ] PVD stasis skin changes B/L Lower EXT  LYMPH: No lymphadenopathy noted  SKIN:  [  ] No rashes or lesions, [  ] Pressure Ulcers, [  ] ecchymosis, [  ] Skin Tears, [x ] Other PVD stasis skin changes B/L Lower EXT    DIET: NPO for colonscopy     LABS:                        11.1   x     )-----------( x        ( 10 Nov 2017 10:38 )             35.2     10 Nov 2017 06:48    157    |  124    |  14     ----------------------------<  97     3.7     |  24     |  0.63     Ca    8.9        10 Nov 2017 06:48      PT/INR - ( 08 Nov 2017 12:01 )   PT: 11.1 sec;   INR: 1.02 ratio         PTT - ( 08 Nov 2017 12:01 )  PTT:30.6 sec                         Anemia Panel:  Iron Total, Serum: 30 ug/dL (11-09-17 @ 08:11)  Iron - Total Binding Capacity.: 205 ug/dL (11-09-17 @ 08:11)  Ferritin, Serum: 47.0 ng/mL (11-09-17 @ 08:11)  Absolute Reticulocytes: 78.8 K/uL (11-09-17 @ 08:11)  Vitamin B12, Serum: 1096 pg/mL (11-09-17 @ 08:11)  Folate, Serum: >20.0 ng/mL (11-09-17 @ 08:11)      Thyroid Panel:  T4, Serum: 7.3 ug/dL (11-09-17 @ 08:11)  Thyroid Stimulating Hormone, Serum: 3.83 uIU/mL (11-09-17 @ 07:15)                RADIOLOGY & ADDITIONAL TESTS:      HEALTH ISSUES - PROBLEM Dx:  Melena: Melena  Need for prophylactic measure: Need for prophylactic measure  Hypothyroid: Hypothyroid  Hypercholesteremia: Hypercholesteremia  GERD (gastroesophageal reflux disease): GERD (gastroesophageal reflux disease)  Anxiety: Anxiety  Iron deficiency anemia: Iron deficiency anemia  GI bleed: GI bleed          Consultant(s) Notes Reviewed:  [  ] YES     Care Discussed with [X] Consultants  [  ] Patient  [  ] Family  [  ]   [  ] Social Service  [  ] RN, [  ] Physical Therapy  DVT PPX: [  ] Lovenox, [  ] S C Heparin, [  ] Coumadin, [  ] Xarelto, [  ] Eliquis, [  ] Pradaxa, [  ] IV Heparin drip, [  ] SCD [  ] Contraindication 2 to GI Bleed,[  ] Ambulation  Advanced directive: [  ] None, [  ] DNR/DNI Patient is a 88y old  Female who presents with a chief complaint of bleeding from rectum (09 Nov 2017 14:21)    INTERVAL HPI:  89 yo F depression, anxiety, GERD, HLD, raynouds, iron deficiency anemia,  hypothyroid presents to ED with rectal bleeding. Pt's 3 daughters are at bedside. Pt states that 3-4 days ago, she noticed perfuse and persistent rectal bleeding. Had to change her diaper about 2-3 hours due to diaper being soaked with bloody diarrhea. Describes the diarrhea has dark, occasionally brown/red with black stools. Pt states that she has had many episodes like this in the past. Denies pain with any episodes. Last episode was early this year. Previous episode before that was about 5 years ago, but with less bleeding. 10 years ago, pt had episode with major bleeding where she had to go to [facility]. Pt states that episodes resolve on own after about 5 days. Pt unsure if shes ever gotten colonoscopy. Does not follow GI. Denies fevers, chills, nausea, vomiting, CP, SOB, abdominal pain, constipation, numbness, tingling. Denies sick contact, travel hx, changes in diet. Pt did not take medications this morning. In the ED, VS temp: 97.5, HR 87, /72, RR 15, SpO2 98, WBC 10.6, Hg 3.44, Hct 33.6, platelets 171, PT 11.1, INR 1.02, PTT 30.6, Na 140, K 4.4, Cl 106, CO2 25, BUN 33, Cr 0.72, alk phos 366, AST 35, ALT 38. Occult blood positive. CXR: no active infiltrates. EKG: NSR at 72 bpm. In the ED, received 1000ml NaCl0.9 % bolus x 1, Protonix 40 mg x 1 dose. Pt s/p 1 unit PRBC, s/p EDG which was negative for upper GI bleed.     Pt seen and examined at bedside this morning. Repeat H/H was performed due to bright red blood found in diaper this am. Pt is NPO to get colonoscopy today. Denies all other complaints. Pt is found to have Hypernatremia likely 2/2 NPO & Colonoscopy prep. on D5W IVFluid.    OVERNIGHT EVENTS: None.     Home Medications:  Aspir 81 oral delayed release tablet: 1 tab(s) orally once a day (08 Nov 2017 19:59)  atorvastatin 10 mg oral tablet: 1 tab(s) orally once a day (08 Nov 2017 19:59)  doxepin: 10 milligram(s) orally once a day (08 Nov 2017 19:59)  ezetimibe: 10 milligram(s) orally once a day (08 Nov 2017 19:59)  Iron 100 Plus oral tablet: 1 cap(s) orally every other day (08 Nov 2017 19:59)  Multiple Vitamins oral tablet: 1 tab(s) orally once a day (08 Nov 2017 19:59)  omeprazole: 40 milligram(s) orally once a day (08 Nov 2017 19:59)  PARoxetine 20 mg oral tablet: 2.5 tab(s) orally once a day (08 Nov 2017 19:59)  Synthroid 25 mcg (0.025 mg) oral tablet: 1 tab(s) orally once a day (08 Nov 2017 19:59)  traZODone: 50 milligram(s) orally once a day (at bedtime) (08 Nov 2017 19:59)      MEDICATIONS  (STANDING):  atorvastatin 10 milliGRAM(s) Oral at bedtime  dextrose 5%. 1000 milliLiter(s) (75 mL/Hr) IV Continuous <Continuous>  levothyroxine 25 MICROGram(s) Oral daily  multivitamin 1 Tablet(s) Oral daily  pantoprazole    Tablet 40 milliGRAM(s) Oral two times a day before meals  PARoxetine 10 milliGRAM(s) Oral daily  sucralfate suspension 1 Gram(s) Oral every 6 hours  traZODone 50 milliGRAM(s) Oral at bedtime    MEDICATIONS  (PRN):      Allergies    No Known Allergies    Intolerances        REVIEW OF SYSTEMS:  CONSTITUTIONAL: No fever, No chills, No fatigue, No myalgia, No Body ache, No Weakness  EYES: No eye pain,  No visual disturbances, No discharge, NO Redness  ENMT:  No ear pain, No nose bleed, No vertigo; No sinus or throat pain, No Congestion  NECK: No pain, No stiffness  RESPIRATORY: No cough, wheezing, No  hemoptysis, No shortness of breath  CARDIOVASCULAR: No chest pain, palpitations  GASTROINTESTINAL: bloody diarrhea,  No abdominal or epigastric pain. No nausea, No vomiting; No constipation. [ x ] BM  GENITOURINARY: No dysuria, No frequency, No urgency, No hematuria, or incontinence  NEUROLOGICAL: No headaches, No dizziness, No numbness, No tingling, No tremors, No weakness  EXT: No Swelling, No Pain, No Edema  SKIN:  [ x ] No itching, burning, rashes, or lesions   MUSCULOSKELETAL: No joint pain or swelling; No muscle pain, No back pain, No extremity pain  PSYCHIATRIC: No depression, anxiety, mood swings or difficulty sleeping at night  PAIN SCALE: [ x ] None  [  ] Other-  ROS Unable to obtain due to - [  ] Dementia  [  ] Lethargy  [  ] Sedated [  ] non verbal  REST OF REVIEW Of SYSTEM - [ x ] Normal     Vital Signs Last 24 Hrs  T(C): 37.2 (10 Nov 2017 05:06), Max: 37.2 (10 Nov 2017 05:06)  T(F): 98.9 (10 Nov 2017 05:06), Max: 98.9 (10 Nov 2017 05:06)  HR: 84 (10 Nov 2017 05:06) (72 - 88)  BP: 157/71 (10 Nov 2017 05:06) (129/53 - 157/71)  BP(mean): --  RR: 15 (10 Nov 2017 05:06) (15 - 18)  SpO2: 97% (10 Nov 2017 05:06) (93% - 100%)  Finger Stick      PHYSICAL EXAM:  GENERAL:  [ x ] NAD , [ x ] well appearing, [  ] Agitated, [  ] Drowsy,  [  ] Lethargy, [  ] confused   HEAD:  [ x ] Normal, [  ] Other  EYES:  [ x ] EOMI, [ x ] PERRLA, [ x ] conjunctiva and sclera clear normal, [  ] Other,  [x  ] Pallor,[  ] Discharge  ENMT:  [ x ] Normal, [ x ] Moist mucous membranes, [x  ] Good dentition, [ x ] No Thrush  NECK:  [x  ] Supple, [  x] No JVD, [x  ] Normal thyroid, [  ] Lymphadenopathy [  ] Other  CHEST/LUNG:  [ x ] Clear to auscultation bilaterally, [ x ] Breath Sounds equal   [ x ] No rales, [ x] No rhonchi  [x  ]  No wheezing  HEART:  [ x ] Regular rate and rhythm, [  ] tachycardia, [  ] Bradycardia,  [  ] irregular  [ x ] No murmurs, No rubs, No gallops, [  ] PPM in place (Mfr:  )  ABDOMEN:  [ x ] Soft, [ x ] Nontender, [ x ] Ascites distended, [ x ] No mass, [ x ] Bowel sounds present, [  ] obese  NERVOUS SYSTEM:  [ x ] Alert & Pablo x2 [ x ] Nonfocal  [  ] Confusion  [  ] Encephalopathic [  ] Sedated [  ] Unable to assess, [  ] Other-  EXTREMITIES: stasis skin changes b/l legs [  ] 2+ Peripheral Pulses, No clubbing, No cyanosis,  [ x ] edema B/L lower EXT, +1 nonpitting [ x ] PVD stasis skin changes B/L Lower EXT  LYMPH: No lymphadenopathy noted  SKIN:  [ x ] No rashes or lesions, [  ] Pressure Ulcers, [  ] ecchymosis, [  ] Skin Tears, [x ] Other PVD stasis skin changes B/L Lower EXT    DIET: NPO for colonoscopy     LABS:                        11.1   x     )-----------( x        ( 10 Nov 2017 10:38 )             35.2     10 Nov 2017 06:48    157    |  124    |  14     ----------------------------<  97     3.7     |  24     |  0.63     Ca    8.9        10 Nov 2017 06:48      PT/INR - ( 08 Nov 2017 12:01 )   PT: 11.1 sec;   INR: 1.02 ratio         PTT - ( 08 Nov 2017 12:01 )  PTT:30.6 sec    Anemia Panel:  Iron Total, Serum: 30 ug/dL (11-09-17 @ 08:11)  Iron - Total Binding Capacity.: 205 ug/dL (11-09-17 @ 08:11)  Ferritin, Serum: 47.0 ng/mL (11-09-17 @ 08:11)  Absolute Reticulocytes: 78.8 K/uL (11-09-17 @ 08:11)  Vitamin B12, Serum: 1096 pg/mL (11-09-17 @ 08:11)  Folate, Serum: >20.0 ng/mL (11-09-17 @ 08:11)      Thyroid Panel:  T4, Serum: 7.3 ug/dL (11-09-17 @ 08:11)  Thyroid Stimulating Hormone, Serum: 3.83 uIU/mL (11-09-17 @ 07:15)        HEALTH ISSUES - PROBLEM Dx:  Melena: Melena  Need for prophylactic measure: Need for prophylactic measure  Hypothyroid: Hypothyroid  Hypercholesteremia: Hypercholesteremia  GERD (gastroesophageal reflux disease): GERD (gastroesophageal reflux disease)  Anxiety: Anxiety  Iron deficiency anemia: Iron deficiency anemia  GI bleed: GI bleed          Consultant(s) Notes Reviewed:  [ x ] YES     Care Discussed with [X] Consultants  [  ] Patient  [  ] Family  [  ]   [  ] Social Service  [ x ] RN, [  ] Physical Therapy  DVT PPX: [  ] Lovenox, [  ] S C Heparin, [  ] Coumadin, [  ] Xarelto, [  ] Eliquis, [  ] Pradaxa, [  ] IV Heparin drip, [x  ] SCD [  ] Contraindication 2 to GI Bleed,[  ] Ambulation  Advanced directive: [x  ] None, [  ] DNR/DNI

## 2017-11-10 NOTE — PROGRESS NOTE ADULT - PROBLEM SELECTOR PLAN 2
Likely 2/2 to dehydration due to patient being NPO and receiving bowel prep for colonoscopy  Started on dextrose 5% at 75 mL/hr  Monitor BMP Likely 2/2 to dehydration due to patient being NPO and receiving bowel prep for colonoscopy  Started on dextrose 5% at 75 mL/hr, Repeat Na is 153 D5W  Monitor BMP

## 2017-11-10 NOTE — PROGRESS NOTE ADULT - PROBLEM SELECTOR PLAN 1
R/o lower GI bleed possibly 2/2 AVM, diverticulosis  EGD performed yesterday. Negative for acute process.  Pt's diaper had bright red blood today. Stat h and h remained unchanged from morning lab. Hg 11.1 Hct 35.2  Pt to have colonoscopy today. NPO for now. GI Dr. GILBERTO Caal  Protonix drip changed to 2x day  Carafate 1 g q6 hours  F/u PT eval R/o lower GI bleed possibly 2/2 AVM, diverticulosis  EGD performed yesterday. Negative for acute process.  Pt's diaper had bright red blood today. Stat h and h remained unchanged from morning lab. Hg 11.1 Hct 35.2  Pt to have colonoscopy today. NPO for now. GI Dr. GILBERTO Caal  Protonix 40 mg  2x day  Carafate 1 g q6 hours

## 2017-11-10 NOTE — PROGRESS NOTE ADULT - SUBJECTIVE AND OBJECTIVE BOX
The patient was evaluated (s/p egd 11/9)  88y Female    T(C): 36.6 (11-10-17 @ 12:53), Max: 37.2 (11-10-17 @ 05:06)  HR: 75 (11-10-17 @ 12:53) (72 - 88)  BP: 136/72 (11-10-17 @ 12:53) (129/53 - 157/71)  RR: 16 (11-10-17 @ 12:53) (15 - 16)  SpO2: 96% (11-10-17 @ 12:53) (93% - 100%)  Wt(kg): --    Pt seen, doing well, no anesthesia complications or complaints noted or reported.   No Nausea    No additional recommendations.

## 2017-11-10 NOTE — PROGRESS NOTE ADULT - ASSESSMENT
89 yo F depression, anxiety, GERD, HLD, raynouds, iron deficiency anemia, hypothyroid presents to ED with Emma. Admitted with Melena r/o lower GI bleed, acute on chronic Anemia. Bright red blood in diaper this am.  S/P EGD, for colonoscopy today. NPO for now.

## 2017-11-10 NOTE — PROGRESS NOTE ADULT - PROBLEM SELECTOR PLAN 5
Protonix 40mg PO 2x day Continue Trazadone, Paxil  Pt can bring doxepin from home due to pharmacy not having it

## 2017-11-11 LAB
ANION GAP SERPL CALC-SCNC: 8 MMOL/L — SIGNIFICANT CHANGE UP (ref 5–17)
APTT BLD: 25.2 SEC — LOW (ref 27.5–37.4)
BUN SERPL-MCNC: 13 MG/DL — SIGNIFICANT CHANGE UP (ref 7–23)
CALCIUM SERPL-MCNC: 9.1 MG/DL — SIGNIFICANT CHANGE UP (ref 8.5–10.1)
CHLORIDE SERPL-SCNC: 109 MMOL/L — HIGH (ref 96–108)
CO2 SERPL-SCNC: 25 MMOL/L — SIGNIFICANT CHANGE UP (ref 22–31)
CREAT SERPL-MCNC: 0.59 MG/DL — SIGNIFICANT CHANGE UP (ref 0.5–1.3)
GLUCOSE SERPL-MCNC: 110 MG/DL — HIGH (ref 70–99)
HCT VFR BLD CALC: 32.5 % — LOW (ref 34.5–45)
HGB BLD-MCNC: 10.4 G/DL — LOW (ref 11.5–15.5)
INR BLD: 1.1 RATIO — SIGNIFICANT CHANGE UP (ref 0.88–1.16)
MCHC RBC-ENTMCNC: 30.7 PG — SIGNIFICANT CHANGE UP (ref 27–34)
MCHC RBC-ENTMCNC: 32.2 GM/DL — SIGNIFICANT CHANGE UP (ref 32–36)
MCV RBC AUTO: 95.5 FL — SIGNIFICANT CHANGE UP (ref 80–100)
PLATELET # BLD AUTO: 165 K/UL — SIGNIFICANT CHANGE UP (ref 150–400)
POTASSIUM SERPL-MCNC: 3.8 MMOL/L — SIGNIFICANT CHANGE UP (ref 3.5–5.3)
POTASSIUM SERPL-SCNC: 3.8 MMOL/L — SIGNIFICANT CHANGE UP (ref 3.5–5.3)
PROTHROM AB SERPL-ACNC: 12 SEC — SIGNIFICANT CHANGE UP (ref 9.8–12.7)
RBC # BLD: 3.4 M/UL — LOW (ref 3.8–5.2)
RBC # FLD: 15.2 % — HIGH (ref 10.3–14.5)
SODIUM SERPL-SCNC: 142 MMOL/L — SIGNIFICANT CHANGE UP (ref 135–145)
WBC # BLD: 11.2 K/UL — HIGH (ref 3.8–10.5)
WBC # FLD AUTO: 11.2 K/UL — HIGH (ref 3.8–10.5)

## 2017-11-11 RX ADMIN — PANTOPRAZOLE SODIUM 40 MILLIGRAM(S): 20 TABLET, DELAYED RELEASE ORAL at 17:21

## 2017-11-11 RX ADMIN — PANTOPRAZOLE SODIUM 40 MILLIGRAM(S): 20 TABLET, DELAYED RELEASE ORAL at 06:09

## 2017-11-11 RX ADMIN — Medication 1 GRAM(S): at 17:21

## 2017-11-11 RX ADMIN — ATORVASTATIN CALCIUM 10 MILLIGRAM(S): 80 TABLET, FILM COATED ORAL at 21:28

## 2017-11-11 RX ADMIN — Medication 25 MICROGRAM(S): at 06:09

## 2017-11-11 RX ADMIN — Medication 10 MILLIGRAM(S): at 11:15

## 2017-11-11 RX ADMIN — Medication 1 GRAM(S): at 06:09

## 2017-11-11 RX ADMIN — Medication 1 TABLET(S): at 11:15

## 2017-11-11 RX ADMIN — Medication 1 GRAM(S): at 11:15

## 2017-11-11 RX ADMIN — Medication 1 GRAM(S): at 06:01

## 2017-11-11 RX ADMIN — Medication 50 MILLIGRAM(S): at 21:28

## 2017-11-11 NOTE — PROGRESS NOTE ADULT - SUBJECTIVE AND OBJECTIVE BOX
INTERVAL HPI/OVERNIGHT EVENTS:    s/p colonoscopy with large amount of old blood and multiple diverticuli noted  CTA was negative for active bleed  hemoglobin stable    MEDICATIONS  (STANDING):  atorvastatin 10 milliGRAM(s) Oral at bedtime  levothyroxine 25 MICROGram(s) Oral daily  multivitamin 1 Tablet(s) Oral daily  pantoprazole    Tablet 40 milliGRAM(s) Oral two times a day before meals  PARoxetine 10 milliGRAM(s) Oral daily  sucralfate suspension 1 Gram(s) Oral every 6 hours  traZODone 50 milliGRAM(s) Oral at bedtime    MEDICATIONS  (PRN):      Allergies    No Known Allergies    Intolerances        ROS:   General:  No wt loss, fevers, chills, night sweats, fatigue,   Eyes:  Good vision, no reported pain  ENT:  No sore throat, pain, runny nose, dysphagia  CV:  No pain, palpitations, hypo/hypertension  Resp:  No dyspnea, cough, tachypnea, wheezing  GI:  No pain, No nausea, No vomiting, No diarrhea, No constipation, No weight loss, No fever, No pruritis, + rectal bleeding, No tarry stools, No dysphagia,  :  No pain, bleeding, incontinence, nocturia  Muscle:  No pain, weakness  Neuro:  No weakness, tingling, memory problems  Psych:  No fatigue, insomnia, mood problems, depression  Endocrine:  No polyuria, polydipsia, cold/heat intolerance  Heme:  No petechiae, ecchymosis, easy bruisability  Skin:  No rash, tattoos, scars, edema      PHYSICAL EXAM:   Vital Signs:  Vital Signs Last 24 Hrs  T(C): 36.7 (11 Nov 2017 14:19), Max: 37.1 (11 Nov 2017 05:48)  T(F): 98 (11 Nov 2017 14:19), Max: 98.7 (11 Nov 2017 05:48)  HR: 70 (11 Nov 2017 14:19) (67 - 72)  BP: 122/70 (11 Nov 2017 14:19) (113/64 - 129/66)  BP(mean): --  RR: 17 (11 Nov 2017 14:19) (16 - 17)  SpO2: 95% (11 Nov 2017 14:19) (94% - 95%)  Daily     Daily     GENERAL:  Appears stated age, well-groomed, well-nourished, no distress  HEENT:  NC/AT,  conjunctivae clear and pink, no thyromegaly, nodules, adenopathy, no JVD, sclera -anicteric  CHEST:  Full & symmetric excursion, no increased effort, breath sounds clear  HEART:  Regular rhythm, S1, S2, no murmur/rub/S3/S4, no abdominal bruit, no edema  ABDOMEN:  Soft, non-tender, non-distended, normoactive bowel sounds,  no masses ,no hepato-splenomegaly, no signs of chronic liver disease  EXTEREMITIES:  no cyanosis,clubbing or edema  SKIN:  No rash/erythema/ecchymoses/petechiae/wounds/abscess/warm/dry  NEURO:  Alert, oriented, no asterixis, no tremor, no encephalopathy      LABS:                        10.4   11.2  )-----------( 165      ( 11 Nov 2017 07:10 )             32.5     11-11    142  |  109<H>  |  13  ----------------------------<  110<H>  3.8   |  25  |  0.59    Ca    9.1      11 Nov 2017 07:10      PT/INR - ( 11 Nov 2017 07:10 )   PT: 12.0 sec;   INR: 1.10 ratio         PTT - ( 11 Nov 2017 07:10 )  PTT:25.2 sec      RADIOLOGY & ADDITIONAL TESTS:

## 2017-11-11 NOTE — PROGRESS NOTE ADULT - SUBJECTIVE AND OBJECTIVE BOX
DEPARTMENT OF ANESTHESIA  POST ANESTHETIC EVALUATION    The Patient was interviewed and evaluated    Vital Signs Last 24 Hrs  T(C): 37.1 (11 Nov 2017 05:48), Max: 37.1 (11 Nov 2017 05:48)  T(F): 98.7 (11 Nov 2017 05:48), Max: 98.7 (11 Nov 2017 05:48)  HR: 72 (11 Nov 2017 05:48) (62 - 75)  BP: 113/64 (11 Nov 2017 05:48) (113/64 - 145/65)  BP(mean): --  RR: 16 (11 Nov 2017 05:48) (14 - 16)  SpO2: 94% (11 Nov 2017 05:48) (94% - 98%)    Evaluation:      (x ) No apparent complications or complaints regarding anesthesia care at this time  (x ) All questions were answered    Condition:  (x ) Stable      ( ) Guarded      ( ) Critical    Recommendations:  (x ) None     ( ) Other:

## 2017-11-11 NOTE — PROGRESS NOTE ADULT - PROBLEM SELECTOR PLAN 1
s/p colonoscopy  likely resolving diverticular bleed  cbc daily  cta negative  full liquid diet, advance in am if cbc stable

## 2017-11-11 NOTE — PROGRESS NOTE ADULT - PROBLEM SELECTOR PLAN 9
DVT ppx: hold for now due to GI bleed. Hold asa. SCD's bilaterally
DVT ppx: hold for now due to GI bleed. Hold asa. SCD's bilaterally

## 2017-11-11 NOTE — PROGRESS NOTE ADULT - ASSESSMENT
89 yo F depression, anxiety, GERD, HLD, raynouds, iron deficiency anemia, hypothyroid presents to ED with Emma. Admitted with Melena r/o lower GI bleed, acute on chronic Anemia. Bright red blood in diaper this am.  S/P EGD &  colonoscopy done. Pt on full liquid diet.

## 2017-11-11 NOTE — PROGRESS NOTE ADULT - PROBLEM SELECTOR PLAN 1
lower GI bleed possibly 2/2  diverticulosis-  EGD performed -No Ac bleeding. Negative for acute process.   GI Dr Mg follow up  Protonix 40 mg  2x day  Carafate 1 g q6 hours

## 2017-11-11 NOTE — PROGRESS NOTE ADULT - SUBJECTIVE AND OBJECTIVE BOX
Patient is a 88y old  Female who presents with a chief complaint of bleeding from rectum (09 Nov 2017 14:21)      INTERVAL HPI:  87 yo F depression, anxiety, GERD, HLD, raynouds, iron deficiency anemia,  hypothyroid presents to ED with rectal bleeding. Pt's 3 daughters are at bedside. Pt states that 3-4 days ago, she noticed perfuse and persistent rectal bleeding. Had to change her diaper about 2-3 hours due to diaper being soaked with bloody diarrhea. Describes the diarrhea has dark, occasionally brown/red with black stools. Pt states that she has had many episodes like this in the past. Denies pain with any episodes. Last episode was early this year. Previous episode before that was about 5 years ago, but with less bleeding. 10 years ago, pt had episode with major bleeding where she had to go to [facility]. Pt states that episodes resolve on own after about 5 days. Pt unsure if shes ever gotten colonoscopy. Does not follow GI. Denies fevers, chills, nausea, vomiting, CP, SOB, abdominal pain, constipation, numbness, tingling. Denies sick contact, travel hx, changes in diet. Pt did not take medications this morning. In the ED, VS temp: 97.5, HR 87, /72, RR 15, SpO2 98, WBC 10.6, Hg 3.44, Hct 33.6, platelets 171, PT 11.1, INR 1.02, PTT 30.6, Na 140, K 4.4, Cl 106, CO2 25, BUN 33, Cr 0.72, alk phos 366, AST 35, ALT 38. Occult blood positive. CXR: no active infiltrates. EKG: NSR at 72 bpm. In the ED, received 1000ml NaCl0.9 % bolus x 1, Protonix 40 mg x 1 dose. Pt s/p 1 unit PRBC, s/p EDG which was negative for upper GI bleed.     Pt seen and examined at bedside this morning. Repeat H/H was performed due to bright red blood found in diaper this am. Pt is NPO to get colonoscopy today. Denies all other complaints. Pt is found to have Hypernatremia likely 2/2 NPO  & Colonoscopy prep. on D5W IVFluid., Pt had CTA A/P- No active bleed .Pt on PO diet, H/H stable, no more bleeding BM.    OVERNIGHT EVENTS:NONE    Home Medications:  atorvastatin 10 mg oral tablet: 1 tab(s) orally once a day (08 Nov 2017 19:59)  Dextrose 5% in Water intravenous solution: 1000 milliliter(s) intravenous  (10 Nov 2017 16:14)  doxepin: 10 milligram(s) orally once a day (08 Nov 2017 19:59)  ezetimibe: 10 milligram(s) orally once a day (08 Nov 2017 19:59)  Iron 100 Plus oral tablet: 1 cap(s) orally every other day (08 Nov 2017 19:59)  Multiple Vitamins oral tablet: 1 tab(s) orally once a day (08 Nov 2017 19:59)  omeprazole: 40 milligram(s) orally once a day (08 Nov 2017 19:59)  pantoprazole 40 mg oral delayed release tablet: 1 tab(s) orally 2 times a day (before meals) (10 Nov 2017 15:08)  PARoxetine 20 mg oral tablet: 2.5 tab(s) orally once a day (08 Nov 2017 19:59)  sucralfate 1 g/10 mL oral suspension: 10 milliliter(s) orally every 6 hours (10 Nov 2017 16:14)  Synthroid 25 mcg (0.025 mg) oral tablet: 1 tab(s) orally once a day (08 Nov 2017 19:59)  traZODone: 50 milligram(s) orally once a day (at bedtime) (08 Nov 2017 19:59)      MEDICATIONS  (STANDING):  atorvastatin 10 milliGRAM(s) Oral at bedtime  dextrose 5%. 1000 milliLiter(s) (75 mL/Hr) IV Continuous <Continuous>  levothyroxine 25 MICROGram(s) Oral daily  multivitamin 1 Tablet(s) Oral daily  pantoprazole    Tablet 40 milliGRAM(s) Oral two times a day before meals  PARoxetine 10 milliGRAM(s) Oral daily  sucralfate suspension 1 Gram(s) Oral every 6 hours  traZODone 50 milliGRAM(s) Oral at bedtime    MEDICATIONS  (PRN):      Allergies    No Known Allergies    Intolerances        REVIEW OF SYSTEMS:  CONSTITUTIONAL: No fever, No chills, No fatigue, No myalgia, No Body ache, No Weakness  EYES: No eye pain,  No visual disturbances, No discharge, NO Redness  ENMT:  No ear pain, No nose bleed, No vertigo; No sinus or throat pain, No Congestion  NECK: No pain, No stiffness  RESPIRATORY: No cough, wheezing, No  hemoptysis, No shortness of breath  CARDIOVASCULAR: No chest pain, palpitations  GASTROINTESTINAL: No abdominal or epigastric pain. No nausea, No vomiting; No diarrhea or constipation. [  ] BM  GENITOURINARY: No dysuria, No frequency, No urgency, No hematuria, or incontinence  NEUROLOGICAL: No headaches, No dizziness, No numbness, No tingling, No tremors, No weakness  EXT: No Swelling, No Pain, No Edema  SKIN:  [ x ] No itching, burning, rashes, or lesions   MUSCULOSKELETAL: No joint pain or swelling; No muscle pain, No back pain, No extremity pain  PSYCHIATRIC: No depression, anxiety, mood swings or difficulty sleeping at night  PAIN SCALE: [x  ] None  [  ] Other-  ROS Unable to obtain due to - [  ] Dementia  [  ] Lethargy  [  ] Sedated [  ] non verbal  REST OF REVIEW Of SYSTEM - [ x ] Normal     Vital Signs Last 24 Hrs  T(C): 36.7 (11 Nov 2017 14:19), Max: 37.1 (11 Nov 2017 05:48)  T(F): 98 (11 Nov 2017 14:19), Max: 98.7 (11 Nov 2017 05:48)  HR: 70 (11 Nov 2017 14:19) (62 - 72)  BP: 122/70 (11 Nov 2017 14:19) (113/64 - 137/69)  BP(mean): --  RR: 17 (11 Nov 2017 14:19) (16 - 17)  SpO2: 95% (11 Nov 2017 14:19) (94% - 98%)  Finger Stick        11-11 @ 07:01  -  11-11 @ 14:24  --------------------------------------------------------  IN: 450 mL / OUT: 0 mL / NET: 450 mL        PHYSICAL EXAM:  GENERAL:  [x  ] NAD , [ x ] well appearing, [  ] Agitated, [  ] Drowsy,  [  ] Lethargy, [  ] confused   HEAD:  [ x ] Normal, [  ] Other  EYES:  [ x ] EOMI, [ x ] PERRLA, [ x ] conjunctiva and sclera clear normal, [  ] Other,  [  ] Pallor,[  ] Discharge  ENMT:  [x ] Normal, [ x ] Moist mucous membranes, [x  ] Good dentition, [ x ] No Thrush  NECK:  [ x ] Supple, [ x ] No JVD, [ x ] Normal thyroid, [  ] Lymphadenopathy [  ] Other  CHEST/LUNG:  [x  ] Clear to auscultation bilaterally, [x ] Breath Sounds equal OR Decrease,  [  ] No rales, [ x ] No rhonchi  [ x ]  No wheezing  HEART:  [ x ] Regular rate and rhythm, [  ] tachycardia, [  ] Bradycardia,  [  ] irregular  [  ] No murmurs, No rubs, No gallops, [  ] PPM in place (Mfr:  )  ABDOMEN:  [ x ] Soft, [ x ] Nontender, [ x ] Nondistended, [ x ] No mass, [ x ] Bowel sounds present, [x  ] obese  NERVOUS SYSTEM:  [ x ] Alert & Oriented X 2, [ x ] Nonfocal  [  ] Confusion  [  ] Encephalopathic [  ] Sedated [  ] Unable to assess, [  ] Other-  EXTREMITIES: [x  ] 2+ Peripheral Pulses, No clubbing, No cyanosis,  [  ] edema B/L lower EXT. [ x ] PVD stasis skin changes B/L Lower EXT, Rt >Left leg  LYMPH: No lymphadenopathy noted  SKIN:  [x  ] No rashes or lesions, [  ] Pressure Ulcers, [  ] ecchymosis, [  ] Skin Tears, [  ] Other    DIET: Full liquid    LABS:                        10.4   11.2  )-----------( 165      ( 11 Nov 2017 07:10 )             32.5     11 Nov 2017 07:10    142    |  109    |  13     ----------------------------<  110    3.8     |  25     |  0.59     Ca    9.1        11 Nov 2017 07:10      PT/INR - ( 11 Nov 2017 07:10 )   PT: 12.0 sec;   INR: 1.10 ratio         PTT - ( 11 Nov 2017 07:10 )  PTT:25.2 sec    Anemia Panel:  Iron Total, Serum: 30 ug/dL (11-09-17 @ 08:11)  Iron - Total Binding Capacity.: 205 ug/dL (11-09-17 @ 08:11)  Ferritin, Serum: 47.0 ng/mL (11-09-17 @ 08:11)  Absolute Reticulocytes: 78.8 K/uL (11-09-17 @ 08:11)  Vitamin B12, Serum: 1096 pg/mL (11-09-17 @ 08:11)  Folate, Serum: >20.0 ng/mL (11-09-17 @ 08:11)      Thyroid Panel:  T4, Serum: 7.3 ug/dL (11-09-17 @ 08:11)  Thyroid Stimulating Hormone, Serum: 3.83 uIU/mL (11-09-17 @ 07:15)    RADIOLOGY & ADDITIONAL TESTS:  < from: CT Angio Abdomen and Pelvis w/ IV Cont (11.10.17 @ 16:31) >  TECHNIQUE: Helical axial images were obtained from the domes of the   diaphragm through the pubic symphysis. CT angiographic protocol was   utilized. 95 mls of Omnipaque-350  was administered intravenously without   complication and 5 mls were discarded. Oral contrast was not   administered. Coronal and Sagittal reconstructions were obtained from the   source data.     COMPARISON: No prior CT angiogram examinations of the abdomen and pelvis   are available for comparison.     FINDINGS: On the arterial phase of imaging, there are no areas of   contrast pooling within the lumen of the large bowel to suggest active   bleeding. Colonic diverticulosis is noted. There is no bowel obstruction   or abdominal ascites. A duodenal diverticulum is notable. A small amount   of gas and stool are noted throughout the large bowel loops. The appendix   is normal.    The heart size is borderline enlarged. There is atherosclerotic disease   of the descending aorta and branch arterial vessels. The imaged portions   of the aorta are normal in caliber. Trace bilateral pleural effusions are   noted with adjacent atelectasis.    The patient is status post cholecystectomy. There is slight prominence of   the biliary tree and common bile duct which is likely on the basis of a   postcholecystectomy status. There is a minimal nodular contour to the   liver with mild caudate lobe hypertrophy.    The spleen, pancreas, and adrenal glands appear unremarkable.    There are multiple rounded subcentimeter hypodense foci within the   bilateral kidneys which are too small to characterize. There is no   hydroureteronephrosis bilaterally. The urinary bladder appears   unremarkable.    There are multiple scattered nonspecific subcentimeter retroperitoneal   and mesenteric lymph nodes.    The patient is status post hysterectomy. No adnexal masses are visualized.    There is generalized osteopenia. Multilevel degenerative changes are   noted within the imaged potions of the spine.    IMPRESSION: No CT angiographic evidence for active bleeding within the GI   tract.    If there remains clinical suspicion for GI bleeding, consider further   evaluation with a nuclear bleeding scan.    < end of copied text >      HEALTH ISSUES - PROBLEM Dx:  Anemia: Anemia  Hypernatremia: Hypernatremia  Melena: Melena  Need for prophylactic measure: Need for prophylactic measure  Hypothyroid: Hypothyroid  Hypercholesteremia: Hypercholesteremia  GERD (gastroesophageal reflux disease): GERD (gastroesophageal reflux disease)  Anxiety: Anxiety  Iron deficiency anemia: Iron deficiency anemia  GI bleed: GI bleed          Consultant(s) Notes Reviewed:  [ x ] YES     Care Discussed with [X] Consultants  [x  ] Patient  [  ] Family  [  ]   [  ] Social Service  [ x ] RN, [ x ] Physical Therapy  DVT PPX: [  ] Lovenox, [  ] S C Heparin, [  ] Coumadin, [  ] Xarelto, [  ] Eliquis, [  ] Pradaxa, [  ] IV Heparin drip, [x  ] SCD [  ] Contraindication 2 to GI Bleed,[  ] Ambulation  Advanced directive: [ x ] None, [  ] DNR/DNI

## 2017-11-11 NOTE — PROGRESS NOTE ADULT - PROBLEM SELECTOR PLAN 2
Resolved, Likely 2/2 to dehydration due to patient being NPO and receiving bowel prep for colonoscopy  Started on dextrose 5% at 75 mL/hr, Repeat Na is improved on IV Fluid  Monitor BMP

## 2017-11-12 LAB
ANION GAP SERPL CALC-SCNC: 8 MMOL/L — SIGNIFICANT CHANGE UP (ref 5–17)
BUN SERPL-MCNC: 8 MG/DL — SIGNIFICANT CHANGE UP (ref 7–23)
CALCIUM SERPL-MCNC: 9.4 MG/DL — SIGNIFICANT CHANGE UP (ref 8.5–10.1)
CHLORIDE SERPL-SCNC: 108 MMOL/L — SIGNIFICANT CHANGE UP (ref 96–108)
CO2 SERPL-SCNC: 28 MMOL/L — SIGNIFICANT CHANGE UP (ref 22–31)
CREAT SERPL-MCNC: 0.54 MG/DL — SIGNIFICANT CHANGE UP (ref 0.5–1.3)
GLUCOSE SERPL-MCNC: 91 MG/DL — SIGNIFICANT CHANGE UP (ref 70–99)
HCT VFR BLD CALC: 31.9 % — LOW (ref 34.5–45)
HGB BLD-MCNC: 10.4 G/DL — LOW (ref 11.5–15.5)
MCHC RBC-ENTMCNC: 30.9 PG — SIGNIFICANT CHANGE UP (ref 27–34)
MCHC RBC-ENTMCNC: 32.6 GM/DL — SIGNIFICANT CHANGE UP (ref 32–36)
MCV RBC AUTO: 94.8 FL — SIGNIFICANT CHANGE UP (ref 80–100)
PLATELET # BLD AUTO: 175 K/UL — SIGNIFICANT CHANGE UP (ref 150–400)
POTASSIUM SERPL-MCNC: 3.9 MMOL/L — SIGNIFICANT CHANGE UP (ref 3.5–5.3)
POTASSIUM SERPL-SCNC: 3.9 MMOL/L — SIGNIFICANT CHANGE UP (ref 3.5–5.3)
RBC # BLD: 3.36 M/UL — LOW (ref 3.8–5.2)
RBC # FLD: 15 % — HIGH (ref 10.3–14.5)
SODIUM SERPL-SCNC: 144 MMOL/L — SIGNIFICANT CHANGE UP (ref 135–145)
WBC # BLD: 8.2 K/UL — SIGNIFICANT CHANGE UP (ref 3.8–10.5)
WBC # FLD AUTO: 8.2 K/UL — SIGNIFICANT CHANGE UP (ref 3.8–10.5)

## 2017-11-12 RX ORDER — PANTOPRAZOLE SODIUM 20 MG/1
40 TABLET, DELAYED RELEASE ORAL
Qty: 0 | Refills: 0 | Status: DISCONTINUED | OUTPATIENT
Start: 2017-11-13 | End: 2017-11-13

## 2017-11-12 RX ADMIN — Medication 10 MILLIGRAM(S): at 11:19

## 2017-11-12 RX ADMIN — PANTOPRAZOLE SODIUM 40 MILLIGRAM(S): 20 TABLET, DELAYED RELEASE ORAL at 05:14

## 2017-11-12 RX ADMIN — Medication 1 TABLET(S): at 11:19

## 2017-11-12 RX ADMIN — Medication 25 MICROGRAM(S): at 05:14

## 2017-11-12 RX ADMIN — Medication 1 GRAM(S): at 03:03

## 2017-11-12 RX ADMIN — Medication 50 MILLIGRAM(S): at 21:37

## 2017-11-12 RX ADMIN — Medication 1 GRAM(S): at 05:14

## 2017-11-12 RX ADMIN — ATORVASTATIN CALCIUM 10 MILLIGRAM(S): 80 TABLET, FILM COATED ORAL at 21:37

## 2017-11-12 RX ADMIN — Medication 1 GRAM(S): at 11:21

## 2017-11-12 NOTE — PROGRESS NOTE ADULT - PROBLEM SELECTOR PLAN 3
acute on chronic 2/2 ac GI Blood loss anemia 2/2 Lower GI bleed, likely Diverticular bleed  Anemia work up done

## 2017-11-12 NOTE — PROGRESS NOTE ADULT - SUBJECTIVE AND OBJECTIVE BOX
Patient is a 88y old  Female who presents with a chief complaint of bleeding from rectum (09 Nov 2017 14:21)      INTERVAL HPI:  87 yo F depression, anxiety, GERD, HLD, raynouds, iron deficiency anemia,  hypothyroid presents to ED with rectal bleeding. Pt's 3 daughters are at bedside. Pt states that 3-4 days ago, she noticed perfuse and persistent rectal bleeding. Had to change her diaper about 2-3 hours due to diaper being soaked with bloody diarrhea. Describes the diarrhea has dark, occasionally brown/red with black stools. Pt states that she has had many episodes like this in the past. Denies pain with any episodes. Last episode was early this year. Previous episode before that was about 5 years ago, but with less bleeding. 10 years ago, pt had episode with major bleeding where she had to go to [facility]. Pt states that episodes resolve on own after about 5 days. Pt unsure if shes ever gotten colonoscopy. Does not follow GI. Denies fevers, chills, nausea, vomiting, CP, SOB, abdominal pain, constipation, numbness, tingling. Denies sick contact, travel hx, changes in diet. Pt did not take medications this morning. In the ED, VS temp: 97.5, HR 87, /72, RR 15, SpO2 98, WBC 10.6, Hg 3.44, Hct 33.6, platelets 171, PT 11.1, INR 1.02, PTT 30.6, Na 140, K 4.4, Cl 106, CO2 25, BUN 33, Cr 0.72, alk phos 366, AST 35, ALT 38. Occult blood positive. CXR: no active infiltrates. EKG: NSR at 72 bpm. In the ED, received 1000ml NaCl0.9 % bolus x 1, Protonix 40 mg x 1 dose. Pt s/p 1 unit PRBC, s/p EDG which was negative for upper GI bleed.     Pt seen and examined at bedside this morning. Repeat H/H was performed due to bright red blood found in diaper this am. Pt is NPO to get colonoscopy today. Denies all other complaints. Pt is found to have Hypernatremia likely 2/2 NPO  & Colonoscopy prep. on D5W IVFluid., Pt had CTA A/P- No active bleed .Pt on PO diet, H/H stable, no more bleeding BM. H/H stable,  Advance PO diet, Off IV Fluids.      OVERNIGHT EVENTS:NONE    Home Medications:  atorvastatin 10 mg oral tablet: 1 tab(s) orally once a day (08 Nov 2017 19:59)  Dextrose 5% in Water intravenous solution: 1000 milliliter(s) intravenous  (10 Nov 2017 16:14)  doxepin: 10 milligram(s) orally once a day (08 Nov 2017 19:59)  ezetimibe: 10 milligram(s) orally once a day (08 Nov 2017 19:59)  Iron 100 Plus oral tablet: 1 cap(s) orally every other day (08 Nov 2017 19:59)  Multiple Vitamins oral tablet: 1 tab(s) orally once a day (08 Nov 2017 19:59)  omeprazole: 40 milligram(s) orally once a day (08 Nov 2017 19:59)  pantoprazole 40 mg oral delayed release tablet: 1 tab(s) orally 2 times a day (before meals) (10 Nov 2017 15:08)  PARoxetine 20 mg oral tablet: 2.5 tab(s) orally once a day (08 Nov 2017 19:59)  sucralfate 1 g/10 mL oral suspension: 10 milliliter(s) orally every 6 hours (10 Nov 2017 16:14)  Synthroid 25 mcg (0.025 mg) oral tablet: 1 tab(s) orally once a day (08 Nov 2017 19:59)  traZODone: 50 milligram(s) orally once a day (at bedtime) (08 Nov 2017 19:59)      MEDICATIONS  (STANDING):  atorvastatin 10 milliGRAM(s) Oral at bedtime  levothyroxine 25 MICROGram(s) Oral daily  multivitamin 1 Tablet(s) Oral daily  pantoprazole    Tablet 40 milliGRAM(s) Oral two times a day before meals  PARoxetine 10 milliGRAM(s) Oral daily  sucralfate suspension 1 Gram(s) Oral every 6 hours  traZODone 50 milliGRAM(s) Oral at bedtime    MEDICATIONS  (PRN):      Allergies    No Known Allergies    Intolerances        REVIEW OF SYSTEMS: i feel much better  CONSTITUTIONAL: No fever, No chills, No fatigue, No myalgia, No Body ache, No Weakness  EYES: No eye pain,  No visual disturbances, No discharge, NO Redness  ENMT:  No ear pain, No nose bleed, No vertigo; No sinus or throat pain, No Congestion  NECK: No pain, No stiffness  RESPIRATORY: No cough, wheezing, No  hemoptysis, No shortness of breath  CARDIOVASCULAR: No chest pain, palpitations  GASTROINTESTINAL: No abdominal or epigastric pain. No nausea, No vomiting; No diarrhea or constipation. [  ] BM  GENITOURINARY: No dysuria, No frequency, No urgency, No hematuria, or incontinence  NEUROLOGICAL: No headaches, No dizziness, No numbness, No tingling, No tremors, No weakness  EXT: No Swelling, No Pain, No Edema  SKIN:  [x  ] No itching, burning, rashes, or lesions   MUSCULOSKELETAL: No joint pain or swelling; No muscle pain, No back pain, No extremity pain  PSYCHIATRIC: No depression, anxiety, mood swings or difficulty sleeping at night  PAIN SCALE: [ x ] None  [  ] Other-  ROS Unable to obtain due to - [  ] Dementia  [  ] Lethargy  [  ] Sedated [  ] non verbal  REST OF REVIEW Of SYSTEM - [ x ] Normal     Vital Signs Last 24 Hrs  T(C): 36.8 (12 Nov 2017 04:40), Max: 36.8 (12 Nov 2017 04:40)  T(F): 98.3 (12 Nov 2017 04:40), Max: 98.3 (12 Nov 2017 04:40)  HR: 63 (12 Nov 2017 04:40) (63 - 68)  BP: 130/65 (12 Nov 2017 04:40) (130/65 - 147/74)  BP(mean): --  RR: 16 (12 Nov 2017 04:40) (16 - 17)  SpO2: 92% (12 Nov 2017 04:40) (92% - 100%)  Finger Stick        11-11 @ 07:01  -  11-12 @ 07:00  --------------------------------------------------------  IN: 1385 mL / OUT: 0 mL / NET: 1385 mL        PHYSICAL EXAM:OOB to chair  GENERAL:  [ x ] NAD , [x  ] well appearing, [  ] Agitated, [  ] Drowsy,  [  ] Lethargy, [  ] confused   HEAD:  [ x ] Normal, [  ] Other  EYES:  [ x ] EOMI, [ x ] PERRLA, [x  ] conjunctiva and sclera clear normal, [  ] Other,  [  ] Pallor,[  ] Discharge  ENMT:  [x  ] Normal, [ x ] Moist mucous membranes, [  ] Good dentition, [  x] No Thrush  NECK:  [x  ] Supple, [x  ] No JVD, [ x ] Normal thyroid, [  ] Lymphadenopathy [  ] Other  CHEST/LUNG:  [ x ] Clear to auscultation bilaterally, [  ] Breath Sounds equal  [x  ] No rales, [ x ] No rhonchi  [ x ]  No wheezing  HEART:  [x  ] Regular rate and rhythm, [  ] tachycardia, [  ] Bradycardia,  [  ] irregular  [  ] No murmurs, No rubs, No gallops, [  ] PPM in place (Mfr:  )  ABDOMEN:  [x  ] Soft, [x  ] Nontender, [x  ] Nondistended, [ x ] No mass, [ x ] Bowel sounds present, [  ] obese  NERVOUS SYSTEM:  [ x ] Alert & Oriented X 2 [x  ] Nonfocal  [  ] Confusion  [  ] Encephalopathic [  ] Sedated [  ] Unable to assess, [  ] Other-  EXTREMITIES: [ x ] 2+ Peripheral Pulses, No clubbing, No cyanosis,  [  ] edema B/L lower EXT. [x  ] PVD stasis skin changes B/L Lower EXT  LYMPH: No lymphadenopathy noted  SKIN:  [  ] No rashes or lesions, [  ] Pressure Ulcers, [  ] ecchymosis, [  ] Skin Tears, [  ] Other    DIET: DASH    LABS:                        10.4   8.2   )-----------( 175      ( 12 Nov 2017 07:18 )             31.9     12 Nov 2017 07:18    144    |  108    |  8      ----------------------------<  91     3.9     |  28     |  0.54     Ca    9.4        12 Nov 2017 07:18      PT/INR - ( 11 Nov 2017 07:10 )   PT: 12.0 sec;   INR: 1.10 ratio         PTT - ( 11 Nov 2017 07:10 )  PTT:25.2 sec  Anemia Panel:  Iron Total, Serum: 30 ug/dL (11-09-17 @ 08:11)  Iron - Total Binding Capacity.: 205 ug/dL (11-09-17 @ 08:11)  Ferritin, Serum: 47.0 ng/mL (11-09-17 @ 08:11)  Absolute Reticulocytes: 78.8 K/uL (11-09-17 @ 08:11)  Vitamin B12, Serum: 1096 pg/mL (11-09-17 @ 08:11)  Folate, Serum: >20.0 ng/mL (11-09-17 @ 08:11)      Thyroid Panel:  T4, Serum: 7.3 ug/dL (11-09-17 @ 08:11)  Thyroid Stimulating Hormone, Serum: 3.83 uIU/mL (11-09-17 @ 07:15)    HEALTH ISSUES - PROBLEM Dx:  Anemia: Anemia  Hypernatremia: Hypernatremia  Melena: Melena  Need for prophylactic measure: Need for prophylactic measure  Hypothyroid: Hypothyroid  Hypercholesteremia: Hypercholesteremia  GERD (gastroesophageal reflux disease): GERD (gastroesophageal reflux disease)  Anxiety: Anxiety  Iron deficiency anemia: Iron deficiency anemia  GI bleed: GI bleed    Consultant(s) Notes Reviewed:  [ x ] YES     Care Discussed with [X] Consultants  [x  ] Patient  [  ] Family  [  ]   [  ] Social Service  [ x ] RN, [  ] Physical Therapy  DVT PPX: [  ] Lovenox, [  ] S C Heparin, [  ] Coumadin, [  ] Xarelto, [  ] Eliquis, [  ] Pradaxa, [  ] IV Heparin drip, [x  ] SCD [  ] Contraindication 2 to GI Bleed,[  ] Ambulation  Advanced directive: [ x ] None, [  ] DNR/DNI Patient is a 88y old  Female who presents with a chief complaint of bleeding from rectum (09 Nov 2017 14:21)      INTERVAL HPI:  87 yo F depression, anxiety, GERD, HLD, raynouds, iron deficiency anemia,  hypothyroid presents to ED with rectal bleeding. Pt's 3 daughters are at bedside. Pt states that 3-4 days ago, she noticed perfuse and persistent rectal bleeding. Had to change her diaper about 2-3 hours due to diaper being soaked with bloody diarrhea. Describes the diarrhea has dark, occasionally brown/red with black stools. Pt states that she has had many episodes like this in the past. Denies pain with any episodes. Last episode was early this year. Previous episode before that was about 5 years ago, but with less bleeding. 10 years ago, pt had episode with major bleeding where she had to go to [facility]. Pt states that episodes resolve on own after about 5 days. Pt unsure if shes ever gotten colonoscopy. Does not follow GI. Denies fevers, chills, nausea, vomiting, CP, SOB, abdominal pain, constipation, numbness, tingling. Denies sick contact, travel hx, changes in diet. Pt did not take medications this morning. In the ED, VS temp: 97.5, HR 87, /72, RR 15, SpO2 98, WBC 10.6, Hg 3.44, Hct 33.6, platelets 171, PT 11.1, INR 1.02, PTT 30.6, Na 140, K 4.4, Cl 106, CO2 25, BUN 33, Cr 0.72, alk phos 366, AST 35, ALT 38. Occult blood positive. CXR: no active infiltrates. EKG: NSR at 72 bpm. In the ED, received 1000ml NaCl0.9 % bolus x 1, Protonix 40 mg x 1 dose. Pt s/p 1 unit PRBC, s/p EDG which was negative for upper GI bleed.     Pt seen and examined at bedside this morning, s/p EGD -No bleeding, s/p  colonoscopy- unable to perform 2/2 to blood & large diverticula. Denies all other complaints.  Pt had CTA A/P- No active bleed .Pt on PO diet, H/H stable, no more bleeding BM. H/H stable,  Advance PO diet, Off IV Fluids.      OVERNIGHT EVENTS:NONE    Home Medications:  atorvastatin 10 mg oral tablet: 1 tab(s) orally once a day (08 Nov 2017 19:59)  Dextrose 5% in Water intravenous solution: 1000 milliliter(s) intravenous  (10 Nov 2017 16:14)  doxepin: 10 milligram(s) orally once a day (08 Nov 2017 19:59)  ezetimibe: 10 milligram(s) orally once a day (08 Nov 2017 19:59)  Iron 100 Plus oral tablet: 1 cap(s) orally every other day (08 Nov 2017 19:59)  Multiple Vitamins oral tablet: 1 tab(s) orally once a day (08 Nov 2017 19:59)  omeprazole: 40 milligram(s) orally once a day (08 Nov 2017 19:59)  pantoprazole 40 mg oral delayed release tablet: 1 tab(s) orally 2 times a day (before meals) (10 Nov 2017 15:08)  PARoxetine 20 mg oral tablet: 2.5 tab(s) orally once a day (08 Nov 2017 19:59)  sucralfate 1 g/10 mL oral suspension: 10 milliliter(s) orally every 6 hours (10 Nov 2017 16:14)  Synthroid 25 mcg (0.025 mg) oral tablet: 1 tab(s) orally once a day (08 Nov 2017 19:59)  traZODone: 50 milligram(s) orally once a day (at bedtime) (08 Nov 2017 19:59)      MEDICATIONS  (STANDING):  atorvastatin 10 milliGRAM(s) Oral at bedtime  levothyroxine 25 MICROGram(s) Oral daily  multivitamin 1 Tablet(s) Oral daily  pantoprazole    Tablet 40 milliGRAM(s) Oral two times a day before meals  PARoxetine 10 milliGRAM(s) Oral daily  sucralfate suspension 1 Gram(s) Oral every 6 hours  traZODone 50 milliGRAM(s) Oral at bedtime    MEDICATIONS  (PRN):      Allergies    No Known Allergies    Intolerances        REVIEW OF SYSTEMS: i feel much better  CONSTITUTIONAL: No fever, No chills, No fatigue, No myalgia, No Body ache, No Weakness  EYES: No eye pain,  No visual disturbances, No discharge, NO Redness  ENMT:  No ear pain, No nose bleed, No vertigo; No sinus or throat pain, No Congestion  NECK: No pain, No stiffness  RESPIRATORY: No cough, wheezing, No  hemoptysis, No shortness of breath  CARDIOVASCULAR: No chest pain, palpitations  GASTROINTESTINAL: No abdominal or epigastric pain. No nausea, No vomiting; No diarrhea or constipation. [  ] BM  GENITOURINARY: No dysuria, No frequency, No urgency, No hematuria, or incontinence  NEUROLOGICAL: No headaches, No dizziness, No numbness, No tingling, No tremors, No weakness  EXT: No Swelling, No Pain, No Edema  SKIN:  [x  ] No itching, burning, rashes, or lesions   MUSCULOSKELETAL: No joint pain or swelling; No muscle pain, No back pain, No extremity pain  PSYCHIATRIC: No depression, anxiety, mood swings or difficulty sleeping at night  PAIN SCALE: [ x ] None  [  ] Other-  ROS Unable to obtain due to - [  ] Dementia  [  ] Lethargy  [  ] Sedated [  ] non verbal  REST OF REVIEW Of SYSTEM - [ x ] Normal     Vital Signs Last 24 Hrs  T(C): 36.8 (12 Nov 2017 04:40), Max: 36.8 (12 Nov 2017 04:40)  T(F): 98.3 (12 Nov 2017 04:40), Max: 98.3 (12 Nov 2017 04:40)  HR: 63 (12 Nov 2017 04:40) (63 - 68)  BP: 130/65 (12 Nov 2017 04:40) (130/65 - 147/74)  BP(mean): --  RR: 16 (12 Nov 2017 04:40) (16 - 17)  SpO2: 92% (12 Nov 2017 04:40) (92% - 100%)  Finger Stick        11-11 @ 07:01  -  11-12 @ 07:00  --------------------------------------------------------  IN: 1385 mL / OUT: 0 mL / NET: 1385 mL        PHYSICAL EXAM:OOB to chair  GENERAL:  [ x ] NAD , [x  ] well appearing, [  ] Agitated, [  ] Drowsy,  [  ] Lethargy, [  ] confused   HEAD:  [ x ] Normal, [  ] Other  EYES:  [ x ] EOMI, [ x ] PERRLA, [x  ] conjunctiva and sclera clear normal, [  ] Other,  [  ] Pallor,[  ] Discharge  ENMT:  [x  ] Normal, [ x ] Moist mucous membranes, [  ] Good dentition, [  x] No Thrush  NECK:  [x  ] Supple, [x  ] No JVD, [ x ] Normal thyroid, [  ] Lymphadenopathy [  ] Other  CHEST/LUNG:  [ x ] Clear to auscultation bilaterally, [  ] Breath Sounds equal  [x  ] No rales, [ x ] No rhonchi  [ x ]  No wheezing  HEART:  [x  ] Regular rate and rhythm, [  ] tachycardia, [  ] Bradycardia,  [  ] irregular  [  ] No murmurs, No rubs, No gallops, [  ] PPM in place (Mfr:  )  ABDOMEN:  [x  ] Soft, [x  ] Nontender, [x  ] Nondistended, [ x ] No mass, [ x ] Bowel sounds present, [  ] obese  NERVOUS SYSTEM:  [ x ] Alert & Oriented X 2 [x  ] Nonfocal  [  ] Confusion  [  ] Encephalopathic [  ] Sedated [  ] Unable to assess, [  ] Other-  EXTREMITIES: [ x ] 2+ Peripheral Pulses, No clubbing, No cyanosis,  [  ] edema B/L lower EXT. [x  ] PVD stasis skin changes B/L Lower EXT  LYMPH: No lymphadenopathy noted  SKIN:  [  ] No rashes or lesions, [  ] Pressure Ulcers, [  ] ecchymosis, [  ] Skin Tears, [  ] Other    DIET: DASH    LABS:                        10.4   8.2   )-----------( 175      ( 12 Nov 2017 07:18 )             31.9     12 Nov 2017 07:18    144    |  108    |  8      ----------------------------<  91     3.9     |  28     |  0.54     Ca    9.4        12 Nov 2017 07:18      PT/INR - ( 11 Nov 2017 07:10 )   PT: 12.0 sec;   INR: 1.10 ratio         PTT - ( 11 Nov 2017 07:10 )  PTT:25.2 sec  Anemia Panel:  Iron Total, Serum: 30 ug/dL (11-09-17 @ 08:11)  Iron - Total Binding Capacity.: 205 ug/dL (11-09-17 @ 08:11)  Ferritin, Serum: 47.0 ng/mL (11-09-17 @ 08:11)  Absolute Reticulocytes: 78.8 K/uL (11-09-17 @ 08:11)  Vitamin B12, Serum: 1096 pg/mL (11-09-17 @ 08:11)  Folate, Serum: >20.0 ng/mL (11-09-17 @ 08:11)      Thyroid Panel:  T4, Serum: 7.3 ug/dL (11-09-17 @ 08:11)  Thyroid Stimulating Hormone, Serum: 3.83 uIU/mL (11-09-17 @ 07:15)    HEALTH ISSUES - PROBLEM Dx:  Anemia: Anemia  Hypernatremia: Hypernatremia  Melena: Melena  Need for prophylactic measure: Need for prophylactic measure  Hypothyroid: Hypothyroid  Hypercholesteremia: Hypercholesteremia  GERD (gastroesophageal reflux disease): GERD (gastroesophageal reflux disease)  Anxiety: Anxiety  Iron deficiency anemia: Iron deficiency anemia  GI bleed: GI bleed    Consultant(s) Notes Reviewed:  [ x ] YES     Care Discussed with [X] Consultants  [x  ] Patient  [  ] Family  [  ]   [  ] Social Service  [ x ] RN, [  ] Physical Therapy  DVT PPX: [  ] Lovenox, [  ] S C Heparin, [  ] Coumadin, [  ] Xarelto, [  ] Eliquis, [  ] Pradaxa, [  ] IV Heparin drip, [x  ] SCD [  ] Contraindication 2 to GI Bleed,[  ] Ambulation  Advanced directive: [ x ] None, [  ] DNR/DNI

## 2017-11-12 NOTE — PROGRESS NOTE ADULT - SUBJECTIVE AND OBJECTIVE BOX
INTERVAL HPI/OVERNIGHT EVENTS:    no new events  hemoglobin stable    MEDICATIONS  (STANDING):  atorvastatin 10 milliGRAM(s) Oral at bedtime  levothyroxine 25 MICROGram(s) Oral daily  multivitamin 1 Tablet(s) Oral daily  pantoprazole    Tablet 40 milliGRAM(s) Oral two times a day before meals  PARoxetine 10 milliGRAM(s) Oral daily  sucralfate suspension 1 Gram(s) Oral every 6 hours  traZODone 50 milliGRAM(s) Oral at bedtime    MEDICATIONS  (PRN):      Allergies    No Known Allergies    Intolerances        ROS:   General:  No wt loss, fevers, chills, night sweats, fatigue,   Eyes:  Good vision, no reported pain  ENT:  No sore throat, pain, runny nose, dysphagia  CV:  No pain, palpitations, hypo/hypertension  Resp:  No dyspnea, cough, tachypnea, wheezing  GI:  No pain, No nausea, No vomiting, No diarrhea, No constipation, No weight loss, No fever, No pruritis, + rectal bleeding, No tarry stools, No dysphagia,  :  No pain, bleeding, incontinence, nocturia  Muscle:  No pain, weakness  Neuro:  No weakness, tingling, memory problems  Psych:  No fatigue, insomnia, mood problems, depression  Endocrine:  No polyuria, polydipsia, cold/heat intolerance  Heme:  No petechiae, ecchymosis, easy bruisability  Skin:  No rash, tattoos, scars, edema      PHYSICAL EXAM:   Vital Signs:  Vital Signs Last 24 Hrs  T(C): 36.7 (11 Nov 2017 14:19), Max: 37.1 (11 Nov 2017 05:48)  T(F): 98 (11 Nov 2017 14:19), Max: 98.7 (11 Nov 2017 05:48)  HR: 70 (11 Nov 2017 14:19) (67 - 72)  BP: 122/70 (11 Nov 2017 14:19) (113/64 - 129/66)  BP(mean): --  RR: 17 (11 Nov 2017 14:19) (16 - 17)  SpO2: 95% (11 Nov 2017 14:19) (94% - 95%)  Daily     Daily     GENERAL:  Appears stated age, well-groomed, well-nourished, no distress  HEENT:  NC/AT,  conjunctivae clear and pink, no thyromegaly, nodules, adenopathy, no JVD, sclera -anicteric  CHEST:  Full & symmetric excursion, no increased effort, breath sounds clear  HEART:  Regular rhythm, S1, S2, no murmur/rub/S3/S4, no abdominal bruit, no edema  ABDOMEN:  Soft, non-tender, non-distended, normoactive bowel sounds,  no masses ,no hepato-splenomegaly, no signs of chronic liver disease  EXTEREMITIES:  no cyanosis,clubbing or edema  SKIN:  No rash/erythema/ecchymoses/petechiae/wounds/abscess/warm/dry  NEURO:  Alert, oriented, no asterixis, no tremor, no encephalopathy      LABS:                        10.4   11.2  )-----------( 165      ( 11 Nov 2017 07:10 )             32.5     11-11    142  |  109<H>  |  13  ----------------------------<  110<H>  3.8   |  25  |  0.59    Ca    9.1      11 Nov 2017 07:10      PT/INR - ( 11 Nov 2017 07:10 )   PT: 12.0 sec;   INR: 1.10 ratio         PTT - ( 11 Nov 2017 07:10 )  PTT:25.2 sec      RADIOLOGY & ADDITIONAL TESTS:

## 2017-11-12 NOTE — PROGRESS NOTE ADULT - PROBLEM SELECTOR PLAN 2
Resolved, Likely 2/2 to dehydration due to patient being NPO and receiving bowel prep for colonoscopy  Off IV Fluid-Po fluids  Monitor BMP

## 2017-11-12 NOTE — PROGRESS NOTE ADULT - PROBLEM SELECTOR PLAN 1
s/p colonoscopy  likely resolving diverticular bleed  cbc daily  cta negative  full liquid diet, advance in am if cbc stable S/P EGD- No active Bleeding s/p colonoscopy-unable to perform 2/2 to blood in colon with large multiple diverticula   likely resolving diverticular bleed as per GI  cbc daily  CTA negative for bleeding  PO diet

## 2017-11-12 NOTE — PROGRESS NOTE ADULT - ASSESSMENT
87 yo F depression, anxiety, GERD, HLD, raynouds, iron deficiency anemia, hypothyroid presents to ED with Emma. Admitted with Melena r/o lower GI bleed, acute on chronic Anemia.  S/P EGD &  colonoscopy done.Likely Diverticular Bleed. Pt stable with stable H/h

## 2017-11-13 VITALS
DIASTOLIC BLOOD PRESSURE: 71 MMHG | SYSTOLIC BLOOD PRESSURE: 114 MMHG | RESPIRATION RATE: 16 BRPM | TEMPERATURE: 98 F | HEART RATE: 88 BPM | OXYGEN SATURATION: 100 %

## 2017-11-13 LAB
ANION GAP SERPL CALC-SCNC: 7 MMOL/L — SIGNIFICANT CHANGE UP (ref 5–17)
BUN SERPL-MCNC: 12 MG/DL — SIGNIFICANT CHANGE UP (ref 7–23)
CALCIUM SERPL-MCNC: 9.1 MG/DL — SIGNIFICANT CHANGE UP (ref 8.5–10.1)
CHLORIDE SERPL-SCNC: 108 MMOL/L — SIGNIFICANT CHANGE UP (ref 96–108)
CO2 SERPL-SCNC: 29 MMOL/L — SIGNIFICANT CHANGE UP (ref 22–31)
CREAT SERPL-MCNC: 0.61 MG/DL — SIGNIFICANT CHANGE UP (ref 0.5–1.3)
GLUCOSE SERPL-MCNC: 89 MG/DL — SIGNIFICANT CHANGE UP (ref 70–99)
HCT VFR BLD CALC: 33.9 % — LOW (ref 34.5–45)
HGB BLD-MCNC: 10.6 G/DL — LOW (ref 11.5–15.5)
MCHC RBC-ENTMCNC: 30.2 PG — SIGNIFICANT CHANGE UP (ref 27–34)
MCHC RBC-ENTMCNC: 31.2 GM/DL — LOW (ref 32–36)
MCV RBC AUTO: 96.8 FL — SIGNIFICANT CHANGE UP (ref 80–100)
PLATELET # BLD AUTO: 200 K/UL — SIGNIFICANT CHANGE UP (ref 150–400)
POTASSIUM SERPL-MCNC: 4 MMOL/L — SIGNIFICANT CHANGE UP (ref 3.5–5.3)
POTASSIUM SERPL-SCNC: 4 MMOL/L — SIGNIFICANT CHANGE UP (ref 3.5–5.3)
RBC # BLD: 3.5 M/UL — LOW (ref 3.8–5.2)
RBC # FLD: 15.2 % — HIGH (ref 10.3–14.5)
SODIUM SERPL-SCNC: 144 MMOL/L — SIGNIFICANT CHANGE UP (ref 135–145)
WBC # BLD: 9.4 K/UL — SIGNIFICANT CHANGE UP (ref 3.8–10.5)
WBC # FLD AUTO: 9.4 K/UL — SIGNIFICANT CHANGE UP (ref 3.8–10.5)

## 2017-11-13 RX ORDER — OMEPRAZOLE 10 MG/1
40 CAPSULE, DELAYED RELEASE ORAL
Qty: 0 | Refills: 0 | COMMUNITY

## 2017-11-13 RX ORDER — ASPIRIN/CALCIUM CARB/MAGNESIUM 324 MG
81 TABLET ORAL DAILY
Qty: 0 | Refills: 0 | Status: DISCONTINUED | OUTPATIENT
Start: 2017-11-13 | End: 2017-11-13

## 2017-11-13 RX ORDER — ASPIRIN/CALCIUM CARB/MAGNESIUM 324 MG
1 TABLET ORAL
Qty: 0 | Refills: 0 | COMMUNITY
Start: 2017-11-13

## 2017-11-13 RX ORDER — OMEPRAZOLE 10 MG/1
1 CAPSULE, DELAYED RELEASE ORAL
Qty: 0 | Refills: 0 | COMMUNITY
Start: 2017-11-13

## 2017-11-13 RX ADMIN — PANTOPRAZOLE SODIUM 40 MILLIGRAM(S): 20 TABLET, DELAYED RELEASE ORAL at 05:39

## 2017-11-13 RX ADMIN — Medication 1 TABLET(S): at 11:59

## 2017-11-13 RX ADMIN — Medication 81 MILLIGRAM(S): at 14:24

## 2017-11-13 RX ADMIN — Medication 10 MILLIGRAM(S): at 11:59

## 2017-11-13 RX ADMIN — Medication 25 MICROGRAM(S): at 05:38

## 2017-11-13 NOTE — PROGRESS NOTE ADULT - SUBJECTIVE AND OBJECTIVE BOX
Patient is a 88y old  Female who presents with a chief complaint of bleeding from rectum (09 Nov 2017 14:21)      INTERVAL HPI:  89 yo F depression, anxiety, GERD, HLD, raynouds, iron deficiency anemia,  hypothyroid presents to ED with rectal bleeding. Pt's 3 daughters are at bedside. Pt states that 3-4 days ago, she noticed perfuse and persistent rectal bleeding. Had to change her diaper about 2-3 hours due to diaper being soaked with bloody diarrhea. Describes the diarrhea has dark, occasionally brown/red with black stools. Pt states that she has had many episodes like this in the past. Denies pain with any episodes. Last episode was early this year. Previous episode before that was about 5 years ago, but with less bleeding. 10 years ago, pt had episode with major bleeding where she had to go to [facility]. Pt states that episodes resolve on own after about 5 days. Pt unsure if shes ever gotten colonoscopy. Does not follow GI. Denies fevers, chills, nausea, vomiting, CP, SOB, abdominal pain, constipation, numbness, tingling. Denies sick contact, travel hx, changes in diet. Pt did not take medications this morning. In the ED, VS temp: 97.5, HR 87, /72, RR 15, SpO2 98, WBC 10.6, Hg 3.44, Hct 33.6, platelets 171, PT 11.1, INR 1.02, PTT 30.6, Na 140, K 4.4, Cl 106, CO2 25, BUN 33, Cr 0.72, alk phos 366, AST 35, ALT 38. Occult blood positive. CXR: no active infiltrates. EKG: NSR at 72 bpm. In the ED, received 1000ml NaCl0.9 % bolus x 1, Protonix 40 mg x 1 dose. Pt s/p 1 unit PRBC, s/p EDG which was negative for upper GI bleed.     Pt seen and examined at bedside this morning, s/p EGD -No bleeding, s/p  colonoscopy- unable to perform 2/2 to blood & large diverticula. Denies all other complaints.  Pt had CTA A/P- No active bleed .Pt on PO diet, H/H stable, no more bleeding BM. H/H stable,  Advanced to PO diet, Off IV Fluids. Pt states that she is feeling fine. Denies all complaints. Ready to eat breakfast.      OVERNIGHT EVENTS: None    Home Medications:  atorvastatin 10 mg oral tablet: 1 tab(s) orally once a day (08 Nov 2017 19:59)  Dextrose 5% in Water intravenous solution: 1000 milliliter(s) intravenous  (10 Nov 2017 16:14)  doxepin: 10 milligram(s) orally once a day (08 Nov 2017 19:59)  ezetimibe: 10 milligram(s) orally once a day (08 Nov 2017 19:59)  Iron 100 Plus oral tablet: 1 cap(s) orally every other day (08 Nov 2017 19:59)  Multiple Vitamins oral tablet: 1 tab(s) orally once a day (08 Nov 2017 19:59)  omeprazole: 40 milligram(s) orally once a day (08 Nov 2017 19:59)  pantoprazole 40 mg oral delayed release tablet: 1 tab(s) orally 2 times a day (before meals) (10 Nov 2017 15:08)  PARoxetine 20 mg oral tablet: 2.5 tab(s) orally once a day (08 Nov 2017 19:59)  sucralfate 1 g/10 mL oral suspension: 10 milliliter(s) orally every 6 hours (10 Nov 2017 16:14)  Synthroid 25 mcg (0.025 mg) oral tablet: 1 tab(s) orally once a day (08 Nov 2017 19:59)  traZODone: 50 milligram(s) orally once a day (at bedtime) (08 Nov 2017 19:59)      MEDICATIONS  (STANDING):  atorvastatin 10 milliGRAM(s) Oral at bedtime  levothyroxine 25 MICROGram(s) Oral daily  multivitamin 1 Tablet(s) Oral daily  pantoprazole    Tablet 40 milliGRAM(s) Oral before breakfast  PARoxetine 10 milliGRAM(s) Oral daily  traZODone 50 milliGRAM(s) Oral at bedtime    MEDICATIONS  (PRN):      Allergies    No Known Allergies    Intolerances        REVIEW OF SYSTEMS: I feel fine  CONSTITUTIONAL: No fever, No chills, No fatigue, No myalgia, No Body ache, No Weakness  EYES: No eye pain,  No visual disturbances, No discharge, NO Redness  ENMT:  No ear pain, No nose bleed, No vertigo; No sinus or throat pain, No Congestion  NECK: No pain, No stiffness  RESPIRATORY: No cough, wheezing, No  hemoptysis, No shortness of breath  CARDIOVASCULAR: No chest pain, palpitations  GASTROINTESTINAL: No abdominal or epigastric pain. No nausea, No vomiting; No diarrhea or constipation. [  ] BM  GENITOURINARY: No dysuria, No frequency, No urgency, No hematuria, or incontinence  NEUROLOGICAL: No headaches, No dizziness, No numbness, No tingling, No tremors, No weakness  EXT: No Swelling, No Pain, No Edema  SKIN:  [x  ] No itching, burning, rashes, or lesions   MUSCULOSKELETAL: No joint pain or swelling; No muscle pain, No back pain, No extremity pain  PSYCHIATRIC: No depression, anxiety, mood swings or difficulty sleeping at night  PAIN SCALE: [ x ] None  [  ] Other-  ROS Unable to obtain due to - [  ] Dementia  [  ] Lethargy  [  ] Sedated [  ] non verbal  REST OF REVIEW Of SYSTEM - [ x ] Normal     Vital Signs Last 24 Hrs  T(C): 36.7 (13 Nov 2017 09:33), Max: 36.8 (12 Nov 2017 14:58)  T(F): 98.1 (13 Nov 2017 09:33), Max: 98.3 (12 Nov 2017 14:58)  HR: 66 (13 Nov 2017 09:33) (66 - 118)  BP: 117/71 (13 Nov 2017 09:33) (117/71 - 127/74)  BP(mean): --  RR: 16 (13 Nov 2017 09:33) (16 - 16)  SpO2: 97% (13 Nov 2017 09:33) (82% - 99%)  Finger Stick          PHYSICAL EXAM:  GENERAL:  [ x ] NAD , [x  ] well appearing, [  ] Agitated, [  ] Drowsy,  [  ] Lethargy, [  ] confused   HEAD:  [ x ] Normal, [  ] Other  EYES:  [ x ] EOMI, [ x ] PERRLA, [x  ] conjunctiva and sclera clear normal, [  ] Other,  [  ] Pallor,[  ] Discharge  ENMT:  [x  ] Normal, [ x ] Moist mucous membranes, [  ] Good dentition, [  x] No Thrush  NECK:  [x  ] Supple, [x  ] No JVD, [ x ] Normal thyroid, [  ] Lymphadenopathy [  ] Other  CHEST/LUNG:  [ x ] Clear to auscultation bilaterally, [  ] Breath Sounds equal  [x  ] No rales, [ x ] No rhonchi  [ x ]  No wheezing  HEART:  [x  ] Regular rate and rhythm, [  ] tachycardia, [  ] Bradycardia,  [  ] irregular  [  ] No murmurs, No rubs, No gallops, [  ] PPM in place (Mfr:  )  ABDOMEN:  [x  ] Soft, [x  ] Nontender, [x  ] Nondistended, [ x ] No mass, [ x ] Bowel sounds present, [  ] obese  NERVOUS SYSTEM:  [ x ] Alert & Oriented X 3  [x  ] Nonfocal  [  ] Confusion  [  ] Encephalopathic [  ] Sedated [  ] Unable to assess, [  ] Other-  EXTREMITIES: [ x ] 2+ Peripheral Pulses, No clubbing, No cyanosis,  [  ] edema B/L lower EXT. [x  ] PVD stasis skin changes B/L Lower EXT  LYMPH: No lymphadenopathy noted  SKIN:  [  ] No rashes or lesions, [  ] Pressure Ulcers, [  ] ecchymosis, [  ] Skin Tears, [  ] Other      DIET:     LABS:                        10.6   9.4   )-----------( 200      ( 13 Nov 2017 08:32 )             33.9     13 Nov 2017 08:32    144    |  108    |  12     ----------------------------<  89     4.0     |  29     |  0.61     Ca    9.1        13 Nov 2017 08:32           Anemia Panel:  Iron Total, Serum: 30 ug/dL (11-09-17 @ 08:11)  Iron - Total Binding Capacity.: 205 ug/dL (11-09-17 @ 08:11)  Ferritin, Serum: 47.0 ng/mL (11-09-17 @ 08:11)  Absolute Reticulocytes: 78.8 K/uL (11-09-17 @ 08:11)  Vitamin B12, Serum: 1096 pg/mL (11-09-17 @ 08:11)  Folate, Serum: >20.0 ng/mL (11-09-17 @ 08:11)      Thyroid Panel:  T4, Serum: 7.3 ug/dL (11-09-17 @ 08:11)  Thyroid Stimulating Hormone, Serum: 3.83 uIU/mL (11-09-17 @ 07:15)                RADIOLOGY & ADDITIONAL TESTS:      HEALTH ISSUES - PROBLEM Dx:  Anemia: Anemia  Hypernatremia: Hypernatremia  Melena: Melena  Need for prophylactic measure: Need for prophylactic measure  Hypothyroid: Hypothyroid  Hypercholesteremia: Hypercholesteremia  GERD (gastroesophageal reflux disease): GERD (gastroesophageal reflux disease)  Anxiety: Anxiety  Iron deficiency anemia: Iron deficiency anemia  GI bleed: GI bleed          Consultant(s) Notes Reviewed:  [  ] YES     Care Discussed with [X] Consultants  [  ] Patient  [  ] Family  [  ]   [  ] Social Service  [  ] RN, [  ] Physical Therapy  DVT PPX: [  ] Lovenox, [  ] S C Heparin, [  ] Coumadin, [  ] Xarelto, [  ] Eliquis, [  ] Pradaxa, [  ] IV Heparin drip, [  ] SCD [  ] Contraindication 2 to GI Bleed,[  ] Ambulation  Advanced directive: [  ] None, [  ] DNR/DNI Patient is a 88y old  Female who presents with a chief complaint of bleeding from rectum (09 Nov 2017 14:21)      INTERVAL HPI:  87 yo F depression, anxiety, GERD, HLD, raynouds, iron deficiency anemia,  hypothyroid presents to ED with rectal bleeding. Pt's 3 daughters are at bedside. Pt states that 3-4 days ago, she noticed perfuse and persistent rectal bleeding. Had to change her diaper about 2-3 hours due to diaper being soaked with bloody diarrhea. Describes the diarrhea has dark, occasionally brown/red with black stools. Pt states that she has had many episodes like this in the past. Denies pain with any episodes. Last episode was early this year. Previous episode before that was about 5 years ago, but with less bleeding. 10 years ago, pt had episode with major bleeding where she had to go to [facility]. Pt states that episodes resolve on own after about 5 days. Pt unsure if shes ever gotten colonoscopy. Does not follow GI. Denies fevers, chills, nausea, vomiting, CP, SOB, abdominal pain, constipation, numbness, tingling. Denies sick contact, travel hx, changes in diet. Pt did not take medications this morning. In the ED, VS temp: 97.5, HR 87, /72, RR 15, SpO2 98, WBC 10.6, Hg 3.44, Hct 33.6, platelets 171, PT 11.1, INR 1.02, PTT 30.6, Na 140, K 4.4, Cl 106, CO2 25, BUN 33, Cr 0.72, alk phos 366, AST 35, ALT 38. Occult blood positive. CXR: no active infiltrates. EKG: NSR at 72 bpm. In the ED, received 1000ml NaCl0.9 % bolus x 1, Protonix 40 mg x 1 dose. Pt s/p 1 unit PRBC, s/p EDG which was negative for upper GI bleed.     Pt seen and examined at bedside this morning, s/p EGD -No bleeding, s/p  colonoscopy- initiated BUT  unable to perform 2/2 to blood & large diverticula. Denies all other complaints.  Pt had CTA A/P- No active bleed .Pt on PO diet, H/H stable, no more bleeding BM. H/H stable,  Advanced to PO diet, Off IV Fluids. Pt states that she is feeling fine. Denies all complaints. Ready for d/c..      OVERNIGHT EVENTS: None    Home Medications:  atorvastatin 10 mg oral tablet: 1 tab(s) orally once a day (08 Nov 2017 19:59)  Dextrose 5% in Water intravenous solution: 1000 milliliter(s) intravenous  (10 Nov 2017 16:14)  doxepin: 10 milligram(s) orally once a day (08 Nov 2017 19:59)  ezetimibe: 10 milligram(s) orally once a day (08 Nov 2017 19:59)  Iron 100 Plus oral tablet: 1 cap(s) orally every other day (08 Nov 2017 19:59)  Multiple Vitamins oral tablet: 1 tab(s) orally once a day (08 Nov 2017 19:59)  omeprazole: 40 milligram(s) orally once a day (08 Nov 2017 19:59)  pantoprazole 40 mg oral delayed release tablet: 1 tab(s) orally 2 times a day (before meals) (10 Nov 2017 15:08)  PARoxetine 20 mg oral tablet: 2.5 tab(s) orally once a day (08 Nov 2017 19:59)  sucralfate 1 g/10 mL oral suspension: 10 milliliter(s) orally every 6 hours (10 Nov 2017 16:14)  Synthroid 25 mcg (0.025 mg) oral tablet: 1 tab(s) orally once a day (08 Nov 2017 19:59)  traZODone: 50 milligram(s) orally once a day (at bedtime) (08 Nov 2017 19:59)      MEDICATIONS  (STANDING):  atorvastatin 10 milliGRAM(s) Oral at bedtime  levothyroxine 25 MICROGram(s) Oral daily  multivitamin 1 Tablet(s) Oral daily  pantoprazole    Tablet 40 milliGRAM(s) Oral before breakfast  PARoxetine 10 milliGRAM(s) Oral daily  traZODone 50 milliGRAM(s) Oral at bedtime    MEDICATIONS  (PRN):      Allergies    No Known Allergies    Intolerances        REVIEW OF SYSTEMS: I feel fine  CONSTITUTIONAL: No fever, No chills, No fatigue, No myalgia, No Body ache, No Weakness  EYES: No eye pain,  No visual disturbances, No discharge, NO Redness  ENMT:  No ear pain, No nose bleed, No vertigo; No sinus or throat pain, No Congestion  NECK: No pain, No stiffness  RESPIRATORY: No cough, wheezing, No  hemoptysis, No shortness of breath  CARDIOVASCULAR: No chest pain, palpitations  GASTROINTESTINAL: No abdominal or epigastric pain. No nausea, No vomiting; No diarrhea or constipation. [  ] BM  GENITOURINARY: No dysuria, No frequency, No urgency, No hematuria, or incontinence  NEUROLOGICAL: No headaches, No dizziness, No numbness, No tingling, No tremors, No weakness  EXT: No Swelling, No Pain, No Edema  SKIN:  [x  ] No itching, burning, rashes, or lesions   MUSCULOSKELETAL: No joint pain or swelling; No muscle pain, No back pain, No extremity pain  PSYCHIATRIC: No depression, anxiety, mood swings or difficulty sleeping at night  PAIN SCALE: [ x ] None  [  ] Other-  ROS Unable to obtain due to - [  ] Dementia  [  ] Lethargy  [  ] Sedated [  ] non verbal  REST OF REVIEW Of SYSTEM - [ x ] Normal     Vital Signs Last 24 Hrs  T(C): 36.7 (13 Nov 2017 09:33), Max: 36.8 (12 Nov 2017 14:58)  T(F): 98.1 (13 Nov 2017 09:33), Max: 98.3 (12 Nov 2017 14:58)  HR: 66 (13 Nov 2017 09:33) (66 - 118)  BP: 117/71 (13 Nov 2017 09:33) (117/71 - 127/74)  BP(mean): --  RR: 16 (13 Nov 2017 09:33) (16 - 16)  SpO2: 97% (13 Nov 2017 09:33) (82% - 99%)  Finger Stick          PHYSICAL EXAM:  GENERAL:  [ x ] NAD , [x  ] well appearing, [  ] Agitated, [  ] Drowsy,  [  ] Lethargy, [  ] confused   HEAD:  [ x ] Normal, [  ] Other  EYES:  [ x ] EOMI, [ x ] PERRLA, [x  ] conjunctiva and sclera clear normal, [  ] Other,  [  ] Pallor,[  ] Discharge  ENMT:  [x  ] Normal, [ x ] Moist mucous membranes, [  ] Good dentition, [  x] No Thrush  NECK:  [x  ] Supple, [x  ] No JVD, [ x ] Normal thyroid, [  ] Lymphadenopathy [  ] Other  CHEST/LUNG:  [ x ] Clear to auscultation bilaterally, [x  ] Breath Sounds equal  [x  ] No rales, [ x ] No rhonchi  [ x ]  No wheezing  HEART:  [x  ] Regular rate and rhythm, [  ] tachycardia, [  ] Bradycardia,  [  ] irregular  [  ] No murmurs, No rubs, No gallops, [  ] PPM in place (Mfr:  )  ABDOMEN:  [x  ] Soft, [x  ] Nontender, [x  ] Nondistended, [ x ] No mass, [ x ] Bowel sounds present, [  ] obese  NERVOUS SYSTEM:  [ x ] Alert & Oriented X 3  [x  ] Nonfocal  [  ] Confusion  [  ] Encephalopathic [  ] Sedated [  ] Unable to assess, [  ] Other-  EXTREMITIES: [ x ] 2+ Peripheral Pulses, No clubbing, No cyanosis,  [  ] edema B/L lower EXT. [x  ] PVD stasis skin changes B/L Lower EXT  LYMPH: No lymphadenopathy noted  SKIN:  [  ] No rashes or lesions, [  ] Pressure Ulcers, [  ] ecchymosis, [  ] Skin Tears, [  ] Other      DIET: DASH    LABS:                        10.6   9.4   )-----------( 200      ( 13 Nov 2017 08:32 )             33.9     13 Nov 2017 08:32    144    |  108    |  12     ----------------------------<  89     4.0     |  29     |  0.61     Ca    9.1        13 Nov 2017 08:32       Surgical Pathology Final Report -  (11.09.17 @ 15:53)    Surgical Pathology Final Report - :   ACCESSION No:  [id number]    DONNY MANNING                       3        Addendum Report          Addendum Reason  Block tissue submitted to [facility] for Helicobacter  pylori immunostain.    Addendum Diagnosis  1.  Gastric antrum, biopsy:  Immunohistochemistry stain for Helicobacter pylori is negative.    Control is within normal limits.  These immunohistochemical tests have been developed and their  performance characteristics determined by [facility].They have not been cleared or approved by the  US Food and Drug Administration.  The FDA has determined that  such clearance or approval is not necessary.  These tests are  used for clinical purposes.  The laboratory is certified under  "CLIA-88", and is qualified to perform high complexity clinical  testing. [facility] is a business unit of [facility], a wholly-owned subsidiary of  [facility] [employer].  Testing performed at  [facility]. [address] [place] [address], [address] [facility],  [place] [address]. [phone]. David Smith, Medical  Director. [facility]#  [id number].    Amrita Ramírez MD  (Electronic Signature)      Surgical Final Report          Final Diagnosis    1. Gastric antrum, biopsy:  Diffquick stain examined.  Active chronic antral gastritis, mild.  Special stain for Helicobacter pylori is inconclusive.  Addendum to follow to rule out Helicobacter pylori by  immunohistochemistry stain.    2. Gastric body, biopsy:  Diffquick stain examined.  Fundic gland polyp.              DONNY MANNING                       3        Surgical Final Report          Special stain for Helicobacter pylori is negative.    Amrita Ramírez MD  (Electronic Signature)  Reported on: 11/10/17    Clinical Information  Procedure: EGD    Specimen Description  1-Antrum  2-Body    Gross Description  1. The specimen is received in formalin and the specimen  container is labeled: Antrum. It consists of two fragments of tan  soft tissuemeasuring 0.2 and 0.3 cm in greatest dimension.  Entirely submitted. One cassette.    2. The specimen is received in formalin and the specimen  container is labeled: Body. It consists of two fragments of tan  soft tissue, each measuring 0.3 cm in greatest dimension.  Entirely submitted. One cassette.    In addition to other data that may appear on the specimen  containers, all labels have been inspected to confirm the  presence of the patient's name and date of birth.  DN 11/10/2017 5:22 AM      AAddendum Report            Addendum Reason  Block tissue submitted to [facility] for Helicobacter  pylori immunostain.      Addendum Diagnosis  1.  Gastric antrum, biopsy:  Immunohistochemistry stain for Helicobacter pylori is negative.    Control is within normal limits.              DONNY MANNING                       3        AAddendum Report          These immunohistochemical tests have been developed and their  performance characteristics determined by [facility].  They have not been cleared or approved by the  US Food and Drug Administration.  The FDA has determined that  such clearance or approval is not necessary.  These tests are  used for clinical purposes.  The laboratory is certified under  "CLIA-88", and is qualified to perform high complexity clinical  testing. [facility] is a business unit of [facility], a wholly-owned subsidiary of  [facility] [employer].  Testing performed at  [facility]. [address] [place] [address], [address] [place] [id number]. [phone]. David Smith, Medical  Director. [facility]#  [id number].    Amrita Ramírez MD  (Electronic Signature)        Anemia Panel:  Iron Total, Serum: 30 ug/dL (11-09-17 @ 08:11)  Iron - Total Binding Capacity.: 205 ug/dL (11-09-17 @ 08:11)  Ferritin, Serum: 47.0 ng/mL (11-09-17 @ 08:11)  Absolute Reticulocytes: 78.8 K/uL (11-09-17 @ 08:11)  Vitamin B12, Serum: 1096 pg/mL (11-09-17 @ 08:11)  Folate, Serum: >20.0 ng/mL (11-09-17 @ 08:11)      Thyroid Panel:  T4, Serum: 7.3 ug/dL (11-09-17 @ 08:11)  Thyroid Stimulating Hormone, Serum: 3.83 uIU/mL (11-09-17 @ 07:15)        HEALTH ISSUES - PROBLEM Dx:  Anemia: Anemia  Hypernatremia: Hypernatremia  Melena: Melena  Need for prophylactic measure: Need for prophylactic measure  Hypothyroid: Hypothyroid  Hypercholesteremia: Hypercholesteremia  GERD (gastroesophageal reflux disease): GERD (gastroesophageal reflux disease)  Anxiety: Anxiety  Iron deficiency anemia: Iron deficiency anemia  GI bleed: GI bleed          Consultant(s) Notes Reviewed:  [x  ] YES     Care Discussed with [X] Consultants  [x  ] Patient  [x  ] Family dtrs, Collene & Naya [facility] Son in [facility]  [ x ]   [  ] Social Service  [x  ] RN, [ x ] Physical Therapy  DVT PPX: [  ] Lovenox, [  ] S C Heparin, [  ] Coumadin, [  ] Xarelto, [  ] Eliquis, [  ] Pradaxa, [  ] IV Heparin drip, [ x ] SCD [  ] Contraindication 2 to GI Bleed,[  ] Ambulation  Advanced directive: [x  ] None, [  ] DNR/DNI

## 2017-11-13 NOTE — PROGRESS NOTE ADULT - PROBLEM SELECTOR PLAN 1
S/P EGD- No active Bleeding s/p colonoscopy-unable to perform 2/2 to blood in colon with large multiple diverticula   likely resolving diverticular bleed as per GI  cbc daily  CTA negative for bleeding  PO diet  DC home today S/P EGD- No active Bleeding, Bx showed Chronic antral gastritis.on PPI   s/p colonoscopy-unable to perform 2/2 to blood in colon with large multiple diverticula , Bleedind resolved  likely resolving diverticular bleed as per GI  cbc stable,  CTA negative for bleeding, Dr Thorpe- No surgical intervention.  PO diet  D/C home today

## 2017-11-13 NOTE — PROGRESS NOTE ADULT - ATTENDING COMMENTS
Pt seen, examined, case & care plan d/w pt , PT follow up.  AM labs.D/C Plan in AM HCPT
Pt seen, examined, case & care plan d/w pt , PT follow up.  AM labs.
Pt seen, examined, case & care plan d/w residents, pt at detail.   EGD-No Bleeding, Plan for Colonoscopy in AM  NPO after midnight,  Pt is Medically stable for schedule colonoscopy in AM.
Pt seen, examined, case & care plan d/w residents, GI, Pt  &  at detail.  Case d/w HCP-DTR Ankita at [phone] &  -Gilmer at detail. D/W Dtr Naya at detail  D/C home, out pt follow up with PMD & GI at detail.  Total d/c care time is 45 minutes.
Pt seen, examined, case & care plan d/w residents, pt & GI Dr GILBERTO Caal at  detail.  Pt with improving Hypernatremia on D5W continue.... for now  Pt is medically stable for Colonoscopy.

## 2017-11-13 NOTE — PROGRESS NOTE ADULT - PROBLEM SELECTOR PLAN 5
Protonix 40mg PO  daily, Off carafate since Neg EGD Protonix 40mg PO  daily, Off Carafate since Neg EGD

## 2017-11-13 NOTE — PROGRESS NOTE ADULT - PROBLEM SELECTOR PLAN 2
Resolved, Likely 2/2 to dehydration due to patient being NPO and receiving bowel prep for colonoscopy  Off IV Fluid-Po fluids  Monitor BMP Resolved, Likely 2/2 to dehydration due to patient being NPO and receiving bowel prep for colonoscopy  Off IV Fluid-Po fluids  Stable BMP

## 2017-11-13 NOTE — PROGRESS NOTE ADULT - PROBLEM SELECTOR PLAN 3
acute on chronic 2/2 ac GI Blood loss anemia 2/2 Lower GI bleed, likely Diverticular bleed  Anemia work up done acute on chronic 2/2 ac GI Blood loss anemia 2/2 Lower GI bleed, likely Diverticular bleed, stable H/h  Anemia work up done

## 2017-11-13 NOTE — PROGRESS NOTE ADULT - ASSESSMENT
87 yo F depression, anxiety, GERD, HLD, raynouds, iron deficiency anemia, hypothyroid presents to ED with Emma. Admitted with Melena r/o lower GI bleed, acute on chronic Anemia.  S/P EGD &  colonoscopy done.Likely Diverticular Bleed. Pt stable with stable H/h 87 yo F depression, anxiety, GERD, HLD, raynouds, iron deficiency anemia, hypothyroid presents to ED with Emma. Admitted with Melena r/o lower GI bleed, acute on chronic Anemia.  S/P EGD &  colonoscopy done. Likely Diverticular Bleed. Resolved, Ac Blood Anemia with chronic anemia.  Pt stable with stable H/H, on PO diet.

## 2017-11-13 NOTE — PROGRESS NOTE ADULT - PROBLEM SELECTOR PROBLEM 2
GERD (gastroesophageal reflux disease)
Hypernatremia
Iron deficiency anemia
Hypernatremia

## 2017-11-13 NOTE — PROGRESS NOTE ADULT - SUBJECTIVE AND OBJECTIVE BOX
Interval Events: Pt denies abdominal pain, nausea, vomiting, diarrhea, constipation, BRBPR, melena.     HPI: 89 yo F depression, anxiety, GERD, HLD, raynouds, iron deficiency anemia,  hypothyroid presents to ED with rectal bleeding. Pt's 3 daughters are at bedside. Pt states that 3-4 days ago, she noticed perfuse and persistent rectal bleeding. Had to change her diaper about 2-3 hours due to diaper being soaked with bloody diarrhea. Describes the diarrhea has dark, occasionally brown/red with black stools. Pt states that she has had many episodes like this in the past. Denies pain with any episodes. Last episode was early this year. Previous episode before that was about 5 years ago, but with less bleeding. 10 years ago, pt had episode with major bleeding where she had to go to [facility]. Pt states that episodes resolve on own after about 5 days. Pt unsure if shes ever gotten colonoscopy. Does not follow GI. Denies fevers, chills, nausea, vomiting, CP, SOB, abdominal pain, constipation, numbness, tingling. Denies sick contact, travel hx, changes in diet. Pt did not take medications this morning. (+) ASA daily, (+) naproxen    In the ED, VS temp: 97.5, HR 87, /72, RR 15, SpO2 98, WBC 10.6, Hg 3.44, Hct 33.6, platelets 171, PT 11.1, INR 1.02, PTT 30.6, Na 140, K 4.4, Cl 106, CO2 25, BUN 33, Cr 0.72, alk phos 366, AST 35, ALT 38. Occult blood positive. CXR: no active infiltrates. EKG: NSR at 72 bpm  In the ED, received 1000ml NaCl0.9 % bolus x 1, protonix 40 mg x 1      MEDICATIONS  (STANDING):  atorvastatin 10 milliGRAM(s) Oral at bedtime  levothyroxine 25 MICROGram(s) Oral daily  multivitamin 1 Tablet(s) Oral daily  pantoprazole    Tablet 40 milliGRAM(s) Oral before breakfast  PARoxetine 10 milliGRAM(s) Oral daily  traZODone 50 milliGRAM(s) Oral at bedtime    MEDICATIONS  (PRN):      Allergies    No Known Allergies    Intolerances        Review of Systems:    General:  No wt loss, fevers, chills, night sweats,fatigue,   Eyes:  Good vision, no reported pain  ENT:  No sore throat, pain, runny nose, dysphagia  CV:  No pain, palpitations, hypo/hypertension  Resp:  No dyspnea, cough, tachypnea, wheezing  GI:  No pain, No nausea, No vomiting, No diarrhea, No constipation, No weight loss, No fever, No pruritis, No rectal bleeding, No melena, No dysphagia  :  No pain, bleeding, incontinence, nocturia  Muscle:  No pain, weakness  Neuro:  No weakness, tingling, memory problems  Psych:  No fatigue, insomnia, mood problems, depression  Endocrine:  No polyuria, polydypsia, cold/heat intolerance  Heme:  No petechiae, ecchymosis, easy bruisability  Skin:  No rash, tattoos, scars, edema      Vital Signs Last 24 Hrs  T(C): 36.7 (13 Nov 2017 09:33), Max: 36.8 (12 Nov 2017 14:58)  T(F): 98.1 (13 Nov 2017 09:33), Max: 98.3 (12 Nov 2017 14:58)  HR: 66 (13 Nov 2017 09:33) (66 - 118)  BP: 117/71 (13 Nov 2017 09:33) (117/71 - 127/74)  BP(mean): --  RR: 16 (13 Nov 2017 09:33) (16 - 16)  SpO2: 97% (13 Nov 2017 09:33) (82% - 99%)    PHYSICAL EXAM:    Constitutional: NAD, well-developed  HEENT: EOMI, throat clear  Neck: No LAD, supple  Respiratory: CTA and P  Cardiovascular: S1 and S2, RRR, no M  Gastrointestinal: BS+, soft, NT/ND, neg HSM,  Extremities: No peripheral edema, neg clubing, cyanosis  Vascular: 2+ peripheral pulses  Neurological: A/O x 3, no focal deficits  Psychiatric: Normal mood, normal affect  Skin: No rashes      LABS:                        10.6   9.4   )-----------( 200      ( 13 Nov 2017 08:32 )             33.9     11-13    144  |  108  |  12  ----------------------------<  89  4.0   |  29  |  0.61    Ca    9.1      13 Nov 2017 08:32            RADIOLOGY & ADDITIONAL TESTS:

## 2017-12-19 PROCEDURE — 80048 BASIC METABOLIC PNL TOTAL CA: CPT

## 2017-12-19 PROCEDURE — 83550 IRON BINDING TEST: CPT

## 2017-12-19 PROCEDURE — 36430 TRANSFUSION BLD/BLD COMPNT: CPT

## 2017-12-19 PROCEDURE — 71045 X-RAY EXAM CHEST 1 VIEW: CPT

## 2017-12-19 PROCEDURE — 97116 GAIT TRAINING THERAPY: CPT

## 2017-12-19 PROCEDURE — 82728 ASSAY OF FERRITIN: CPT

## 2017-12-19 PROCEDURE — 97162 PT EVAL MOD COMPLEX 30 MIN: CPT

## 2017-12-19 PROCEDURE — 96376 TX/PRO/DX INJ SAME DRUG ADON: CPT

## 2017-12-19 PROCEDURE — 82607 VITAMIN B-12: CPT

## 2017-12-19 PROCEDURE — 86900 BLOOD TYPING SEROLOGIC ABO: CPT

## 2017-12-19 PROCEDURE — 82746 ASSAY OF FOLIC ACID SERUM: CPT

## 2017-12-19 PROCEDURE — 82272 OCCULT BLD FECES 1-3 TESTS: CPT

## 2017-12-19 PROCEDURE — 85027 COMPLETE CBC AUTOMATED: CPT

## 2017-12-19 PROCEDURE — 86901 BLOOD TYPING SEROLOGIC RH(D): CPT

## 2017-12-19 PROCEDURE — 99285 EMERGENCY DEPT VISIT HI MDM: CPT | Mod: 25

## 2017-12-19 PROCEDURE — P9016: CPT

## 2017-12-19 PROCEDURE — 86850 RBC ANTIBODY SCREEN: CPT

## 2017-12-19 PROCEDURE — 93005 ELECTROCARDIOGRAM TRACING: CPT

## 2017-12-19 PROCEDURE — 74174 CTA ABD&PLVS W/CONTRAST: CPT

## 2017-12-19 PROCEDURE — 85045 AUTOMATED RETICULOCYTE COUNT: CPT

## 2017-12-19 PROCEDURE — 84295 ASSAY OF SERUM SODIUM: CPT

## 2017-12-19 PROCEDURE — 97110 THERAPEUTIC EXERCISES: CPT

## 2017-12-19 PROCEDURE — 96374 THER/PROPH/DIAG INJ IV PUSH: CPT | Mod: 59

## 2017-12-19 PROCEDURE — 84443 ASSAY THYROID STIM HORMONE: CPT

## 2017-12-19 PROCEDURE — 84436 ASSAY OF TOTAL THYROXINE: CPT

## 2017-12-19 PROCEDURE — 84480 ASSAY TRIIODOTHYRONINE (T3): CPT

## 2017-12-19 PROCEDURE — 86920 COMPATIBILITY TEST SPIN: CPT

## 2017-12-19 PROCEDURE — 80053 COMPREHEN METABOLIC PANEL: CPT

## 2017-12-19 PROCEDURE — 97530 THERAPEUTIC ACTIVITIES: CPT

## 2017-12-19 PROCEDURE — 88342 IMHCHEM/IMCYTCHM 1ST ANTB: CPT

## 2017-12-19 PROCEDURE — 85730 THROMBOPLASTIN TIME PARTIAL: CPT

## 2017-12-19 PROCEDURE — 88312 SPECIAL STAINS GROUP 1: CPT

## 2017-12-19 PROCEDURE — 88305 TISSUE EXAM BY PATHOLOGIST: CPT

## 2017-12-19 PROCEDURE — 85610 PROTHROMBIN TIME: CPT

## 2017-12-19 PROCEDURE — 85018 HEMOGLOBIN: CPT

## 2018-03-05 ENCOUNTER — INPATIENT (INPATIENT)
Facility: HOSPITAL | Age: 83
LOS: 6 days | Discharge: NURSING, REHAB OR LONG-TERM CARE | DRG: 562 | End: 2018-03-12
Attending: HOSPITALIST | Admitting: INTERNAL MEDICINE
Payer: MEDICARE

## 2018-03-05 VITALS
DIASTOLIC BLOOD PRESSURE: 79 MMHG | RESPIRATION RATE: 18 BRPM | HEART RATE: 88 BPM | TEMPERATURE: 98 F | SYSTOLIC BLOOD PRESSURE: 162 MMHG

## 2018-03-05 DIAGNOSIS — S42.309A UNSPECIFIED FRACTURE OF SHAFT OF HUMERUS, UNSPECIFIED ARM, INITIAL ENCOUNTER FOR CLOSED FRACTURE: ICD-10-CM

## 2018-03-05 DIAGNOSIS — E78.00 PURE HYPERCHOLESTEROLEMIA, UNSPECIFIED: ICD-10-CM

## 2018-03-05 DIAGNOSIS — Z96.653 PRESENCE OF ARTIFICIAL KNEE JOINT, BILATERAL: Chronic | ICD-10-CM

## 2018-03-05 DIAGNOSIS — Z29.9 ENCOUNTER FOR PROPHYLACTIC MEASURES, UNSPECIFIED: ICD-10-CM

## 2018-03-05 DIAGNOSIS — Z98.49 CATARACT EXTRACTION STATUS, UNSPECIFIED EYE: Chronic | ICD-10-CM

## 2018-03-05 DIAGNOSIS — Z90.89 ACQUIRED ABSENCE OF OTHER ORGANS: Chronic | ICD-10-CM

## 2018-03-05 DIAGNOSIS — K21.9 GASTRO-ESOPHAGEAL REFLUX DISEASE WITHOUT ESOPHAGITIS: ICD-10-CM

## 2018-03-05 DIAGNOSIS — D50.9 IRON DEFICIENCY ANEMIA, UNSPECIFIED: ICD-10-CM

## 2018-03-05 DIAGNOSIS — L02.429 FURUNCLE OF LIMB, UNSPECIFIED: Chronic | ICD-10-CM

## 2018-03-05 DIAGNOSIS — Z90.710 ACQUIRED ABSENCE OF BOTH CERVIX AND UTERUS: Chronic | ICD-10-CM

## 2018-03-05 DIAGNOSIS — Z90.49 ACQUIRED ABSENCE OF OTHER SPECIFIED PARTS OF DIGESTIVE TRACT: Chronic | ICD-10-CM

## 2018-03-05 DIAGNOSIS — F32.9 MAJOR DEPRESSIVE DISORDER, SINGLE EPISODE, UNSPECIFIED: ICD-10-CM

## 2018-03-05 DIAGNOSIS — E03.9 HYPOTHYROIDISM, UNSPECIFIED: ICD-10-CM

## 2018-03-05 LAB
ALBUMIN SERPL ELPH-MCNC: 3 G/DL — LOW (ref 3.3–5)
ALP SERPL-CCNC: 451 U/L — HIGH (ref 40–120)
ALT FLD-CCNC: 74 U/L — SIGNIFICANT CHANGE UP (ref 12–78)
ANION GAP SERPL CALC-SCNC: 7 MMOL/L — SIGNIFICANT CHANGE UP (ref 5–17)
APTT BLD: 27.9 SEC — SIGNIFICANT CHANGE UP (ref 27.5–37.4)
AST SERPL-CCNC: 92 U/L — HIGH (ref 15–37)
BASOPHILS # BLD AUTO: 0.1 K/UL — SIGNIFICANT CHANGE UP (ref 0–0.2)
BASOPHILS NFR BLD AUTO: 1.1 % — SIGNIFICANT CHANGE UP (ref 0–2)
BILIRUB SERPL-MCNC: 0.4 MG/DL — SIGNIFICANT CHANGE UP (ref 0.2–1.2)
BUN SERPL-MCNC: 23 MG/DL — SIGNIFICANT CHANGE UP (ref 7–23)
CALCIUM SERPL-MCNC: 9.7 MG/DL — SIGNIFICANT CHANGE UP (ref 8.5–10.1)
CHLORIDE SERPL-SCNC: 108 MMOL/L — SIGNIFICANT CHANGE UP (ref 96–108)
CO2 SERPL-SCNC: 27 MMOL/L — SIGNIFICANT CHANGE UP (ref 22–31)
CREAT SERPL-MCNC: 0.66 MG/DL — SIGNIFICANT CHANGE UP (ref 0.5–1.3)
EOSINOPHIL # BLD AUTO: 0.5 K/UL — SIGNIFICANT CHANGE UP (ref 0–0.5)
EOSINOPHIL NFR BLD AUTO: 5.7 % — SIGNIFICANT CHANGE UP (ref 0–6)
GLUCOSE SERPL-MCNC: 81 MG/DL — SIGNIFICANT CHANGE UP (ref 70–99)
HCT VFR BLD CALC: 42.4 % — SIGNIFICANT CHANGE UP (ref 34.5–45)
HGB BLD-MCNC: 13.9 G/DL — SIGNIFICANT CHANGE UP (ref 11.5–15.5)
INR BLD: 1.01 RATIO — SIGNIFICANT CHANGE UP (ref 0.88–1.16)
LYMPHOCYTES # BLD AUTO: 2.8 K/UL — SIGNIFICANT CHANGE UP (ref 1–3.3)
LYMPHOCYTES # BLD AUTO: 32.7 % — SIGNIFICANT CHANGE UP (ref 13–44)
MCHC RBC-ENTMCNC: 30.7 PG — SIGNIFICANT CHANGE UP (ref 27–34)
MCHC RBC-ENTMCNC: 32.7 GM/DL — SIGNIFICANT CHANGE UP (ref 32–36)
MCV RBC AUTO: 93.9 FL — SIGNIFICANT CHANGE UP (ref 80–100)
MONOCYTES # BLD AUTO: 0.4 K/UL — SIGNIFICANT CHANGE UP (ref 0–0.9)
MONOCYTES NFR BLD AUTO: 4.5 % — SIGNIFICANT CHANGE UP (ref 1–9)
NEUTROPHILS # BLD AUTO: 4.8 K/UL — SIGNIFICANT CHANGE UP (ref 1.8–7.4)
NEUTROPHILS NFR BLD AUTO: 56.1 % — SIGNIFICANT CHANGE UP (ref 43–77)
PLATELET # BLD AUTO: 176 K/UL — SIGNIFICANT CHANGE UP (ref 150–400)
POTASSIUM SERPL-MCNC: 5.6 MMOL/L — HIGH (ref 3.5–5.3)
POTASSIUM SERPL-SCNC: 5.6 MMOL/L — HIGH (ref 3.5–5.3)
PROT SERPL-MCNC: 7 G/DL — SIGNIFICANT CHANGE UP (ref 6–8.3)
PROTHROM AB SERPL-ACNC: 11 SEC — SIGNIFICANT CHANGE UP (ref 9.8–12.7)
RBC # BLD: 4.52 M/UL — SIGNIFICANT CHANGE UP (ref 3.8–5.2)
RBC # FLD: 13.2 % — SIGNIFICANT CHANGE UP (ref 10.3–14.5)
SODIUM SERPL-SCNC: 142 MMOL/L — SIGNIFICANT CHANGE UP (ref 135–145)
WBC # BLD: 8.5 K/UL — SIGNIFICANT CHANGE UP (ref 3.8–10.5)
WBC # FLD AUTO: 8.5 K/UL — SIGNIFICANT CHANGE UP (ref 3.8–10.5)

## 2018-03-05 PROCEDURE — 99223 1ST HOSP IP/OBS HIGH 75: CPT | Mod: AI,GC

## 2018-03-05 PROCEDURE — 99284 EMERGENCY DEPT VISIT MOD MDM: CPT

## 2018-03-05 PROCEDURE — 73060 X-RAY EXAM OF HUMERUS: CPT | Mod: 26,RT

## 2018-03-05 PROCEDURE — 93010 ELECTROCARDIOGRAM REPORT: CPT

## 2018-03-05 RX ORDER — TRAZODONE HCL 50 MG
50 TABLET ORAL AT BEDTIME
Qty: 0 | Refills: 0 | Status: DISCONTINUED | OUTPATIENT
Start: 2018-03-05 | End: 2018-03-12

## 2018-03-05 RX ORDER — TRAZODONE HCL 50 MG
1 TABLET ORAL
Qty: 0 | Refills: 0 | COMMUNITY

## 2018-03-05 RX ORDER — PANTOPRAZOLE SODIUM 20 MG/1
40 TABLET, DELAYED RELEASE ORAL
Qty: 0 | Refills: 0 | Status: DISCONTINUED | OUTPATIENT
Start: 2018-03-05 | End: 2018-03-12

## 2018-03-05 RX ORDER — ATORVASTATIN CALCIUM 80 MG/1
1 TABLET, FILM COATED ORAL
Qty: 0 | Refills: 0 | COMMUNITY

## 2018-03-05 RX ORDER — ASPIRIN/CALCIUM CARB/MAGNESIUM 324 MG
81 TABLET ORAL DAILY
Qty: 0 | Refills: 0 | Status: DISCONTINUED | OUTPATIENT
Start: 2018-03-05 | End: 2018-03-12

## 2018-03-05 RX ORDER — EZETIMIBE 10 MG/1
1 TABLET ORAL
Qty: 0 | Refills: 0 | COMMUNITY

## 2018-03-05 RX ORDER — ONDANSETRON 8 MG/1
4 TABLET, FILM COATED ORAL ONCE
Qty: 0 | Refills: 0 | Status: COMPLETED | OUTPATIENT
Start: 2018-03-05 | End: 2018-03-05

## 2018-03-05 RX ORDER — LEVOTHYROXINE SODIUM 125 MCG
25 TABLET ORAL DAILY
Qty: 0 | Refills: 0 | Status: DISCONTINUED | OUTPATIENT
Start: 2018-03-05 | End: 2018-03-12

## 2018-03-05 RX ORDER — OXYCODONE AND ACETAMINOPHEN 5; 325 MG/1; MG/1
1 TABLET ORAL EVERY 6 HOURS
Qty: 0 | Refills: 0 | Status: DISCONTINUED | OUTPATIENT
Start: 2018-03-05 | End: 2018-03-05

## 2018-03-05 RX ORDER — DOXEPIN HCL 100 MG
10 CAPSULE ORAL
Qty: 0 | Refills: 0 | COMMUNITY

## 2018-03-05 RX ORDER — EZETIMIBE 10 MG/1
10 TABLET ORAL
Qty: 0 | Refills: 0 | COMMUNITY

## 2018-03-05 RX ORDER — ACETAMINOPHEN 500 MG
650 TABLET ORAL EVERY 6 HOURS
Qty: 0 | Refills: 0 | Status: DISCONTINUED | OUTPATIENT
Start: 2018-03-05 | End: 2018-03-12

## 2018-03-05 RX ORDER — DOXEPIN HCL 100 MG
1 CAPSULE ORAL
Qty: 0 | Refills: 0 | COMMUNITY

## 2018-03-05 RX ORDER — TRAZODONE HCL 50 MG
50 TABLET ORAL
Qty: 0 | Refills: 0 | COMMUNITY

## 2018-03-05 RX ORDER — ENOXAPARIN SODIUM 100 MG/ML
40 INJECTION SUBCUTANEOUS DAILY
Qty: 0 | Refills: 0 | Status: DISCONTINUED | OUTPATIENT
Start: 2018-03-05 | End: 2018-03-12

## 2018-03-05 RX ORDER — BENZOYL PEROXIDE MICRONIZED 5.8 %
1 TOWELETTE (EA) TOPICAL
Qty: 0 | Refills: 0 | COMMUNITY

## 2018-03-05 RX ORDER — MORPHINE SULFATE 50 MG/1
2 CAPSULE, EXTENDED RELEASE ORAL ONCE
Qty: 0 | Refills: 0 | Status: DISCONTINUED | OUTPATIENT
Start: 2018-03-05 | End: 2018-03-05

## 2018-03-05 RX ORDER — ATORVASTATIN CALCIUM 80 MG/1
10 TABLET, FILM COATED ORAL AT BEDTIME
Qty: 0 | Refills: 0 | Status: DISCONTINUED | OUTPATIENT
Start: 2018-03-05 | End: 2018-03-12

## 2018-03-05 RX ORDER — LEVOTHYROXINE SODIUM 125 MCG
1 TABLET ORAL
Qty: 0 | Refills: 0 | COMMUNITY

## 2018-03-05 RX ORDER — DOXEPIN HCL 100 MG
10 CAPSULE ORAL DAILY
Qty: 0 | Refills: 0 | Status: DISCONTINUED | OUTPATIENT
Start: 2018-03-05 | End: 2018-03-12

## 2018-03-05 RX ADMIN — ATORVASTATIN CALCIUM 10 MILLIGRAM(S): 80 TABLET, FILM COATED ORAL at 21:55

## 2018-03-05 RX ADMIN — Medication 650 MILLIGRAM(S): at 19:55

## 2018-03-05 RX ADMIN — ONDANSETRON 4 MILLIGRAM(S): 8 TABLET, FILM COATED ORAL at 06:59

## 2018-03-05 RX ADMIN — Medication 650 MILLIGRAM(S): at 18:59

## 2018-03-05 RX ADMIN — Medication 81 MILLIGRAM(S): at 18:58

## 2018-03-05 RX ADMIN — MORPHINE SULFATE 2 MILLIGRAM(S): 50 CAPSULE, EXTENDED RELEASE ORAL at 06:59

## 2018-03-05 RX ADMIN — MORPHINE SULFATE 2 MILLIGRAM(S): 50 CAPSULE, EXTENDED RELEASE ORAL at 11:29

## 2018-03-05 RX ADMIN — Medication 50 MILLIGRAM(S): at 21:55

## 2018-03-05 RX ADMIN — Medication 50 MILLIGRAM(S): at 18:59

## 2018-03-05 NOTE — CONSULT NOTE ADULT - ASSESSMENT
A/P: 88y Female with right humeral shaft fracture, closed  Pain control  NWB RUE in splint, keep c/d/I,   Ice/elevation  Si/sx compartment syndrome discussed with patient, told to return to ED if exhibit any  Possible need for surgical intervention in future discussed, all questions answered  Follow up with Dr. Alexandra or own orthopedist within 1 week , call office for appointment.  Ortho stable for DC

## 2018-03-05 NOTE — ED ADULT NURSE NOTE - PMH
Anxiety    Depression    Diverticulosis    GERD (gastroesophageal reflux disease)    Hypercholesteremia    Hypothyroid    Iron deficiency anemia    Raynaud disease

## 2018-03-05 NOTE — PHYSICAL THERAPY INITIAL EVALUATION ADULT - ADDITIONAL COMMENTS
Daughter at bedside, giving information (with patient's permission). Patient lives in a private home with ramp in the back door. House is a single family home. Grandson and family occupy the 2nd floor; patient lives on main floor and is mostly alone. Patient has a HHA 10hrs per day x 7 days per week. After HHA leaves, patient takes care of herself.

## 2018-03-05 NOTE — PATIENT PROFILE ADULT. - HOW PATIENT ADDRESSED, PROFILE
Alert and oriented to person, place and time, memory intact, behavior appropriate to situation, PERRL. Veronica

## 2018-03-05 NOTE — PHYSICAL THERAPY INITIAL EVALUATION ADULT - RANGE OF MOTION EXAMINATION, REHAB EVAL
Right LE ROM was WFL (within functional limits)/Left LE ROM was WFL (within functional limits)/RUE s/p reduction, ace bandaged dry and intact. lLimited rom LUE shoulder flexion 0 to 95 degrees./Left UE ROM was WFL (within functional limits)

## 2018-03-05 NOTE — H&P ADULT - NEUROLOGICAL DETAILS
sensation intact/deep reflexes intact/cranial nerves intact/alert and oriented x 3/responds to pain/responds to verbal commands

## 2018-03-05 NOTE — H&P ADULT - HISTORY OF PRESENT ILLNESS
87yo female with pmhx of anxiety, depression, diverticulosis, GERD, hypercholesterolemia, hypothyroidism iron deficiency, raynaud disease, presents with right arm pain s/p fall this morning. Patient states she consuelo to get out of bed, reached for her walker, and fell onto the right side of her body. Patient denies hitter her head of LOC. She felt immediate constant right arm pain and was BIBEMS to [facility] ED for further medical evaluation. Xray exhibited fracture of the mid humeral shaft with overlapping fracture fragments and varus angulation at the fracture apex. Orthopedic surgery consult evaluated the patient, performed a closed reduction and placed right arm in padded coaptation splint. Patient was evaluated by Physical Therapy, who determined patient must go to rehabilitation facility prior to returning home. 87yo female with pmhx of anxiety, depression, diverticulosis, GERD, hypercholesterolemia, hypothyroidism iron deficiency, raynaud disease, presents with right arm pain s/p fall this morning. Patient states she consuelo to get out of bed, reached for her walker, and fell onto the right side of her body. Patient denies hitter her head of LOC. She felt immediate constant right arm pain and was BIBEMS to [facility] ED for further medical evaluation. Xray exhibited fracture of the right mid humeral shaft with overlapping fracture fragments and varus angulation at the fracture apex. Orthopedic surgery consult evaluated the patient, performed a closed reduction and placed right arm in padded coaptation splint. Patient was evaluated by Physical Therapy, who determined patient must go to rehabilitation facility prior to returning home. 89yo female with pmhx of anxiety, depression, diverticulosis, GERD, hypercholesterolemia, hypothyroidism iron deficiency, raynaud disease, presents with right arm pain s/p fall this morning. Patient states she consuelo to get out of bed, reached for her walker, and fell onto the right side of her body. Patient denies hit ter her head or LOC. She felt immediate constant right arm pain and was BIBEMS to [facility] ED for further medical evaluation. Xray exhibited fracture of the right mid humeral shaft with overlapping fracture fragments and varus angulation at the fracture apex. Orthopedic surgery consult evaluated the patient, performed a closed reduction and placed right arm in padded coaptation splint. Patient was evaluated by Physical Therapy, who determined patient must go to rehabilitation facility prior to returning home.

## 2018-03-05 NOTE — ED ADULT NURSE NOTE - PSH
Furuncle of ankle  rt side s/p Sx, skin graft  H/O total hysterectomy    History of bilateral knee replacement    History of cataract surgery  bilaterally  History of cholecystectomy    S/P tonsillectomy

## 2018-03-05 NOTE — H&P ADULT - PROBLEM SELECTOR PLAN 1
Mid-humeral shaft fracture, s/p closed reduction and splinting  Awaiting rehab placement via   Pain control with tylenol, percocet

## 2018-03-05 NOTE — ED ADULT NURSE NOTE - OBJECTIVE STATEMENT
88 year old female brought in by EMS from home status post fall. Patient alert and oriented, remembers event and denies loss of consciousness. As per patient she was walking from her bed to turn off her light and fell on her right side. Patient lives with family who heard her fall; patient was not down on the floor for a long period of time. Patient denies hitting her head. Patient complains of right arm pain with movement, no pain at rest. Skin tear sustained to the left forearm, minimally bleeding. Patient is moving fingers, denies numbness/tingling. Patient typically walks at home with a walker.

## 2018-03-05 NOTE — CONSULT NOTE ADULT - SUBJECTIVE AND OBJECTIVE BOX
88y Female community ambulatory with a walker presents c/o R upper arm pain sp mechanical fall. The patient reports she lost her balance and fell this morning in the bathroom landing on her right arm. She is right hand dominant. Denies HS/LOC. Denies numbness/tingling. No other pain/injuries. Denies fevers/chills.     HEALTH ISSUES - PROBLEM Dx:    Anx/Depr, Diverticulosis, GERD, HLD, HypoTh, Raynaud, Anemia,    MEDICATIONS  (STANDING):    Allergies    No Known Allergies    Intolerances      Imaging: XR demonstrates displaced R midshaft humerus fracture        Vital Signs Last 24 Hrs  T(C): 36.8 (03-05-18 @ 06:21), Max: 36.8 (03-05-18 @ 06:21)  T(F): 98.2 (03-05-18 @ 06:21), Max: 98.2 (03-05-18 @ 06:21)  HR: 88 (03-05-18 @ 06:21) (88 - 88)  BP: 162/79 (03-05-18 @ 06:21) (162/79 - 162/79)  BP(mean): --  RR: 18 (03-05-18 @ 06:21) (18 - 18)  SpO2: --    Physical Exam  Gen: NAD, awake and alert.    RUE: Deformity noted over humerus, Skin intact, +TTP mid to distal humerus, no bony ttp elsewhere, compartments soft/compressive, extremity warm/well perfused. No elbow or wrist tenderness or swelling. 2+ radial pulse, +AIN/PIN/M/U/R, SILT C5-T1    Secondary Survey: Full ROM of unaffected extremities, able to SLR B/L, SILT Globally, compartments soft, no bony TTP over bony prominences, no calf TTP B/L, no TTP along axial spine.    Procedure: Closed reduction performed. Placed in well padded coaptation splint, molded appropriately. Post procedure imaging obtained. Post procedure exam unchanged, NV intact, able to wiggles all fingers, SILT distally.

## 2018-03-05 NOTE — H&P ADULT - RS GEN PE MLT RESP DETAILS PC
respirations non-labored/no chest wall tenderness/normal/airway patent/breath sounds equal/clear to auscultation bilaterally/good air movement/no intercostal retractions

## 2018-03-05 NOTE — H&P ADULT - NSHPPHYSICALEXAM_GEN_ALL_CORE
T(F): 97.7 (03-05-18 @ 10:26)  HR: 82 (03-05-18 @ 10:26)  BP: 137/97 (03-05-18 @ 10:26)  RR: 16 (03-05-18 @ 10:26)  SpO2: 98% (03-05-18 @ 10:26)

## 2018-03-05 NOTE — ED ADULT TRIAGE NOTE - CHIEF COMPLAINT QUOTE
patient fell out of bed and injured right arm, unable to move without pain and left arm with skin tear

## 2018-03-05 NOTE — H&P ADULT - ASSESSMENT
89yo female with pmhx of anxiety, depression, diverticulosis, GERD, hypercholesterolemia, hypothyroidism iron deficiency, raynaud disease, presents with right arm pain s/p fall this morning, admitted for rehab placement following fracture of the right mid humeral shaft, s/p closed reduction and splinting.

## 2018-03-05 NOTE — ED PROVIDER NOTE - OBJECTIVE STATEMENT
87yo female bib ems with right arm pain. pt got out of bed and fell to the floor, landed on her right side, no head trauma no LOC. pt c/o diffuse right arm pain, constant, non radiating, no tingling or numnbess

## 2018-03-05 NOTE — H&P ADULT - NSHPSOCIALHISTORY_GEN_ALL_CORE
Lives in [place]  Lives in same house as grandson and family  Never smoker  Social EtOH  No Drugs

## 2018-03-05 NOTE — PHYSICAL THERAPY INITIAL EVALUATION ADULT - GAIT DEVIATIONS NOTED, PT EVAL
decreased step length/increased time in double stance/decreased weight-shifting ability/decreased stride length/decreased peyton

## 2018-03-05 NOTE — H&P ADULT - PROBLEM SELECTOR PLAN 7
Lovenox for DVT ppx    IMPROVE VTE Individual Risk Assessment          RISK                                                          Points    [  ] Previous VTE                                                3  [  ] Thrombophilia                                             2  [  ] Lower limb paralysis                                   2        (unable to hold up >15 seconds)    [  ] Current Cancer                                            2         (within 6 months)  [  ] Immobilization > 24 hrs                              1  [  ] ICU/CCU stay > 24 hours                            1  [  ] Age > 60                                                    1    IMPROVE VTE Score _____1____

## 2018-03-05 NOTE — PHYSICAL THERAPY INITIAL EVALUATION ADULT - PERTINENT HX OF CURRENT PROBLEM, REHAB EVAL
Per daughter at bedside, patient s/p fall around 5am while trying to get up to go to the bathroom prior to admission.

## 2018-03-06 LAB
ANION GAP SERPL CALC-SCNC: 5 MMOL/L — SIGNIFICANT CHANGE UP (ref 5–17)
BUN SERPL-MCNC: 19 MG/DL — SIGNIFICANT CHANGE UP (ref 7–23)
CALCIUM SERPL-MCNC: 9.4 MG/DL — SIGNIFICANT CHANGE UP (ref 8.5–10.1)
CHLORIDE SERPL-SCNC: 106 MMOL/L — SIGNIFICANT CHANGE UP (ref 96–108)
CO2 SERPL-SCNC: 31 MMOL/L — SIGNIFICANT CHANGE UP (ref 22–31)
CREAT SERPL-MCNC: 0.73 MG/DL — SIGNIFICANT CHANGE UP (ref 0.5–1.3)
GLUCOSE SERPL-MCNC: 98 MG/DL — SIGNIFICANT CHANGE UP (ref 70–99)
HCT VFR BLD CALC: 36.8 % — SIGNIFICANT CHANGE UP (ref 34.5–45)
HGB BLD-MCNC: 12.1 G/DL — SIGNIFICANT CHANGE UP (ref 11.5–15.5)
MAGNESIUM SERPL-MCNC: 2 MG/DL — SIGNIFICANT CHANGE UP (ref 1.6–2.6)
MCHC RBC-ENTMCNC: 30.9 PG — SIGNIFICANT CHANGE UP (ref 27–34)
MCHC RBC-ENTMCNC: 32.8 GM/DL — SIGNIFICANT CHANGE UP (ref 32–36)
MCV RBC AUTO: 94.1 FL — SIGNIFICANT CHANGE UP (ref 80–100)
PHOSPHATE SERPL-MCNC: 3.1 MG/DL — SIGNIFICANT CHANGE UP (ref 2.5–4.5)
PLATELET # BLD AUTO: 153 K/UL — SIGNIFICANT CHANGE UP (ref 150–400)
POTASSIUM SERPL-MCNC: 5 MMOL/L — SIGNIFICANT CHANGE UP (ref 3.5–5.3)
POTASSIUM SERPL-SCNC: 5 MMOL/L — SIGNIFICANT CHANGE UP (ref 3.5–5.3)
RBC # BLD: 3.91 M/UL — SIGNIFICANT CHANGE UP (ref 3.8–5.2)
RBC # FLD: 13.3 % — SIGNIFICANT CHANGE UP (ref 10.3–14.5)
SODIUM SERPL-SCNC: 142 MMOL/L — SIGNIFICANT CHANGE UP (ref 135–145)
WBC # BLD: 8.4 K/UL — SIGNIFICANT CHANGE UP (ref 3.8–10.5)
WBC # FLD AUTO: 8.4 K/UL — SIGNIFICANT CHANGE UP (ref 3.8–10.5)

## 2018-03-06 PROCEDURE — 99233 SBSQ HOSP IP/OBS HIGH 50: CPT

## 2018-03-06 RX ORDER — EZETIMIBE 10 MG/1
10 TABLET ORAL DAILY
Qty: 0 | Refills: 0 | Status: DISCONTINUED | OUTPATIENT
Start: 2018-03-06 | End: 2018-03-12

## 2018-03-06 RX ADMIN — PANTOPRAZOLE SODIUM 40 MILLIGRAM(S): 20 TABLET, DELAYED RELEASE ORAL at 05:01

## 2018-03-06 RX ADMIN — ENOXAPARIN SODIUM 40 MILLIGRAM(S): 100 INJECTION SUBCUTANEOUS at 11:37

## 2018-03-06 RX ADMIN — Medication 81 MILLIGRAM(S): at 11:37

## 2018-03-06 RX ADMIN — Medication 1 TABLET(S): at 11:37

## 2018-03-06 RX ADMIN — Medication 25 MICROGRAM(S): at 05:01

## 2018-03-06 RX ADMIN — ATORVASTATIN CALCIUM 10 MILLIGRAM(S): 80 TABLET, FILM COATED ORAL at 22:06

## 2018-03-06 RX ADMIN — Medication 50 MILLIGRAM(S): at 11:37

## 2018-03-06 RX ADMIN — Medication 10 MILLIGRAM(S): at 11:37

## 2018-03-06 RX ADMIN — Medication 50 MILLIGRAM(S): at 22:06

## 2018-03-06 NOTE — PROGRESS NOTE ADULT - SUBJECTIVE AND OBJECTIVE BOX
Patient is a 88y old  Female who presents with a chief complaint of fall (05 Mar 2018 18:11)      INTERVAL HPI/OVERNIGHT EVENTS: Pt states pain in right arm is moderate in severity. Denies CP, SOB, dizziness, palpitations.     MEDICATIONS  (STANDING):  aspirin enteric coated 81 milliGRAM(s) Oral daily  atorvastatin 10 milliGRAM(s) Oral at bedtime  doxepin 10 milliGRAM(s) Oral daily  enoxaparin Injectable 40 milliGRAM(s) SubCutaneous daily  ezetimibe 10 milliGRAM(s) Oral daily  levothyroxine 25 MICROGram(s) Oral daily  multivitamin 1 Tablet(s) Oral daily  pantoprazole    Tablet 40 milliGRAM(s) Oral before breakfast  PARoxetine 50 milliGRAM(s) Oral daily  traZODone 50 milliGRAM(s) Oral at bedtime    MEDICATIONS  (PRN):  acetaminophen   Tablet. 650 milliGRAM(s) Oral every 6 hours PRN Moderate Pain (4 - 6)      Allergies    No Known Allergies    Intolerances        REVIEW OF SYSTEMS:  CONSTITUTIONAL: No fever or chills  HEENT:  No headache, no sore throat  RESPIRATORY: No cough, wheezing, or shortness of breath  CARDIOVASCULAR: No chest pain, palpitations, or leg swelling  GASTROINTESTINAL: No abd pain, nausea, vomiting, or diarrhea  GENITOURINARY: No dysuria, frequency, or hematuria  NEUROLOGICAL: no focal weakness or dizziness  MUSCULOSKELETAL: +right upper arm pain    Vital Signs Last 24 Hrs  T(C): 37.1 (06 Mar 2018 21:34), Max: 37.1 (06 Mar 2018 21:34)  T(F): 98.7 (06 Mar 2018 21:34), Max: 98.7 (06 Mar 2018 21:34)  HR: 82 (06 Mar 2018 21:34) (64 - 82)  BP: 140/78 (06 Mar 2018 21:34) (119/76 - 140/78)  BP(mean): --  RR: 17 (06 Mar 2018 21:34) (16 - 17)  SpO2: 96% (06 Mar 2018 21:34) (90% - 96%)    PHYSICAL EXAM:  GENERAL: NAD at rest  HEENT:  EOMI, moist mucous membranes  CHEST/LUNG:  CTA b/l, no rales, wheezes, or rhonchi  HEART:  RRR, S1, S2  ABDOMEN:  BS+, soft, nontender, nondistended  EXTREMITIES: right arm in splint; compartments soft, palpable radial pulse  NERVOUS SYSTEM: answers questions and follows commands appropriately    LABS:                        12.1   8.4   )-----------( 153      ( 06 Mar 2018 10:49 )             36.8     CBC Full  -  ( 06 Mar 2018 10:49 )  WBC Count : 8.4 K/uL  Hemoglobin : 12.1 g/dL  Hematocrit : 36.8 %  Platelet Count - Automated : 153 K/uL  Mean Cell Volume : 94.1 fl  Mean Cell Hemoglobin : 30.9 pg  Mean Cell Hemoglobin Concentration : 32.8 gm/dL  Auto Neutrophil # : x  Auto Lymphocyte # : x  Auto Monocyte # : x  Auto Eosinophil # : x  Auto Basophil # : x  Auto Neutrophil % : x  Auto Lymphocyte % : x  Auto Monocyte % : x  Auto Eosinophil % : x  Auto Basophil % : x    06 Mar 2018 10:49    142    |  106    |  19     ----------------------------<  98     5.0     |  31     |  0.73     Ca    9.4        06 Mar 2018 10:49  Phos  3.1       06 Mar 2018 10:49  Mg     2.0       06 Mar 2018 10:49      PT/INR - ( 05 Mar 2018 12:50 )   PT: 11.0 sec;   INR: 1.01 ratio         PTT - ( 05 Mar 2018 12:50 )  PTT:27.9 sec    CAPILLARY BLOOD GLUCOSE              RADIOLOGY & ADDITIONAL TESTS:  There is a long cast overlying the humerus.  There is a fracture of the mid humeral shaft with overriding fracture fragments.    Personally reviewed.     Consultant(s) Notes Reviewed:  [x] YES  [ ] NO

## 2018-03-07 DIAGNOSIS — S42.391A OTHER FRACTURE OF SHAFT OF RIGHT HUMERUS, INITIAL ENCOUNTER FOR CLOSED FRACTURE: ICD-10-CM

## 2018-03-07 DIAGNOSIS — R74.8 ABNORMAL LEVELS OF OTHER SERUM ENZYMES: ICD-10-CM

## 2018-03-07 LAB
ALBUMIN SERPL ELPH-MCNC: 2.7 G/DL — LOW (ref 3.3–5)
ALP SERPL-CCNC: 466 U/L — HIGH (ref 40–120)
ALT FLD-CCNC: 104 U/L — HIGH (ref 12–78)
ANION GAP SERPL CALC-SCNC: 6 MMOL/L — SIGNIFICANT CHANGE UP (ref 5–17)
AST SERPL-CCNC: 110 U/L — HIGH (ref 15–37)
BILIRUB SERPL-MCNC: 0.4 MG/DL — SIGNIFICANT CHANGE UP (ref 0.2–1.2)
BUN SERPL-MCNC: 21 MG/DL — SIGNIFICANT CHANGE UP (ref 7–23)
CALCIUM SERPL-MCNC: 9.7 MG/DL — SIGNIFICANT CHANGE UP (ref 8.5–10.1)
CHLORIDE SERPL-SCNC: 102 MMOL/L — SIGNIFICANT CHANGE UP (ref 96–108)
CO2 SERPL-SCNC: 30 MMOL/L — SIGNIFICANT CHANGE UP (ref 22–31)
CREAT SERPL-MCNC: 0.78 MG/DL — SIGNIFICANT CHANGE UP (ref 0.5–1.3)
GLUCOSE SERPL-MCNC: 96 MG/DL — SIGNIFICANT CHANGE UP (ref 70–99)
HCT VFR BLD CALC: 37.2 % — SIGNIFICANT CHANGE UP (ref 34.5–45)
HGB BLD-MCNC: 12.2 G/DL — SIGNIFICANT CHANGE UP (ref 11.5–15.5)
MCHC RBC-ENTMCNC: 30.9 PG — SIGNIFICANT CHANGE UP (ref 27–34)
MCHC RBC-ENTMCNC: 33 GM/DL — SIGNIFICANT CHANGE UP (ref 32–36)
MCV RBC AUTO: 93.8 FL — SIGNIFICANT CHANGE UP (ref 80–100)
PLATELET # BLD AUTO: 147 K/UL — LOW (ref 150–400)
POTASSIUM SERPL-MCNC: 4.4 MMOL/L — SIGNIFICANT CHANGE UP (ref 3.5–5.3)
POTASSIUM SERPL-SCNC: 4.4 MMOL/L — SIGNIFICANT CHANGE UP (ref 3.5–5.3)
PROT SERPL-MCNC: 6.3 G/DL — SIGNIFICANT CHANGE UP (ref 6–8.3)
RBC # BLD: 3.96 M/UL — SIGNIFICANT CHANGE UP (ref 3.8–5.2)
RBC # FLD: 12.8 % — SIGNIFICANT CHANGE UP (ref 10.3–14.5)
SODIUM SERPL-SCNC: 138 MMOL/L — SIGNIFICANT CHANGE UP (ref 135–145)
WBC # BLD: 9.6 K/UL — SIGNIFICANT CHANGE UP (ref 3.8–10.5)
WBC # FLD AUTO: 9.6 K/UL — SIGNIFICANT CHANGE UP (ref 3.8–10.5)

## 2018-03-07 PROCEDURE — 99233 SBSQ HOSP IP/OBS HIGH 50: CPT

## 2018-03-07 RX ADMIN — Medication 50 MILLIGRAM(S): at 12:09

## 2018-03-07 RX ADMIN — ATORVASTATIN CALCIUM 10 MILLIGRAM(S): 80 TABLET, FILM COATED ORAL at 21:30

## 2018-03-07 RX ADMIN — Medication 10 MILLIGRAM(S): at 12:10

## 2018-03-07 RX ADMIN — Medication 25 MICROGRAM(S): at 05:30

## 2018-03-07 RX ADMIN — Medication 81 MILLIGRAM(S): at 12:09

## 2018-03-07 RX ADMIN — Medication 50 MILLIGRAM(S): at 21:30

## 2018-03-07 RX ADMIN — ENOXAPARIN SODIUM 40 MILLIGRAM(S): 100 INJECTION SUBCUTANEOUS at 12:09

## 2018-03-07 RX ADMIN — PANTOPRAZOLE SODIUM 40 MILLIGRAM(S): 20 TABLET, DELAYED RELEASE ORAL at 05:30

## 2018-03-07 RX ADMIN — Medication 1 TABLET(S): at 12:11

## 2018-03-07 RX ADMIN — EZETIMIBE 10 MILLIGRAM(S): 10 TABLET ORAL at 12:10

## 2018-03-07 NOTE — PROGRESS NOTE ADULT - SUBJECTIVE AND OBJECTIVE BOX
Patient is a 88y old  Female who presents with a chief complaint of fall and right arm pain. (05 Mar 2018 18:11)      INTERVAL HPI/OVERNIGHT EVENTS: Pt reports increased swelling in right forearm/hand. Denies fever, chills, SOB, CP.     MEDICATIONS  (STANDING):  aspirin enteric coated 81 milliGRAM(s) Oral daily  atorvastatin 10 milliGRAM(s) Oral at bedtime  doxepin 10 milliGRAM(s) Oral daily  enoxaparin Injectable 40 milliGRAM(s) SubCutaneous daily  ezetimibe 10 milliGRAM(s) Oral daily  levothyroxine 25 MICROGram(s) Oral daily  multivitamin 1 Tablet(s) Oral daily  pantoprazole    Tablet 40 milliGRAM(s) Oral before breakfast  PARoxetine 50 milliGRAM(s) Oral daily  traZODone 50 milliGRAM(s) Oral at bedtime    MEDICATIONS  (PRN):  acetaminophen   Tablet. 650 milliGRAM(s) Oral every 6 hours PRN Moderate Pain (4 - 6)      Allergies    No Known Allergies    Intolerances        REVIEW OF SYSTEMS:  CONSTITUTIONAL: No fever or chills  HEENT:  No headache, no sore throat  RESPIRATORY: No cough, wheezing, or shortness of breath  CARDIOVASCULAR: No chest pain, palpitations, or leg swelling  GASTROINTESTINAL: No abd pain, nausea, vomiting, or diarrhea  GENITOURINARY: No dysuria, frequency, or hematuria  NEUROLOGICAL: no focal weakness or dizziness  MUSCULOSKELETAL: +right upper arm pain; +swelling in right forearm and hand     Vital Signs Last 24 Hrs  T(C): 37.7 (07 Mar 2018 21:29), Max: 37.7 (07 Mar 2018 21:29)  T(F): 99.8 (07 Mar 2018 21:29), Max: 99.8 (07 Mar 2018 21:29)  HR: 86 (07 Mar 2018 21:29) (86 - 95)  BP: 142/73 (07 Mar 2018 21:29) (109/63 - 142/73)  BP(mean): --  RR: 18 (07 Mar 2018 21:29) (15 - 18)  SpO2: 95% (07 Mar 2018 21:29) (91% - 96%)    PHYSICAL EXAM:  GENERAL: NAD at rest  HEENT:  EOMI, moist mucous membranes  CHEST/LUNG:  CTA b/l, no rales, wheezes, or rhonchi  HEART:  RRR, S1, S2  ABDOMEN:  BS+, soft, nontender, nondistended  EXTREMITIES: right arm in splint; worsening edema of the right forearm/hand, palpable radial pulse  NERVOUS SYSTEM: answers questions and follows commands appropriately    LABS:                        12.2   9.6   )-----------( 147      ( 07 Mar 2018 08:28 )             37.2     CBC Full  -  ( 07 Mar 2018 08:28 )  WBC Count : 9.6 K/uL  Hemoglobin : 12.2 g/dL  Hematocrit : 37.2 %  Platelet Count - Automated : 147 K/uL  Mean Cell Volume : 93.8 fl  Mean Cell Hemoglobin : 30.9 pg  Mean Cell Hemoglobin Concentration : 33.0 gm/dL  Auto Neutrophil # : x  Auto Lymphocyte # : x  Auto Monocyte # : x  Auto Eosinophil # : x  Auto Basophil # : x  Auto Neutrophil % : x  Auto Lymphocyte % : x  Auto Monocyte % : x  Auto Eosinophil % : x  Auto Basophil % : x    07 Mar 2018 08:28    138    |  102    |  21     ----------------------------<  96     4.4     |  30     |  0.78     Ca    9.7        07 Mar 2018 08:28    TPro  6.3    /  Alb  2.7    /  TBili  0.4    /  DBili  x      /  AST  110    /  ALT  104    /  AlkPhos  466    07 Mar 2018 08:28        CAPILLARY BLOOD GLUCOSE              RADIOLOGY & ADDITIONAL TESTS:    Personally reviewed.     Consultant(s) Notes Reviewed:  [x] YES  [ ] NO Patient is a 88y old  Female who presents with a chief complaint of fall and right arm pain. (05 Mar 2018 18:11)    INTERVAL HPI/OVERNIGHT EVENTS: Pt reports increased swelling in right forearm/hand. Denies fever, chills, SOB, CP.     MEDICATIONS  (STANDING):  aspirin enteric coated 81 milliGRAM(s) Oral daily  atorvastatin 10 milliGRAM(s) Oral at bedtime  doxepin 10 milliGRAM(s) Oral daily  enoxaparin Injectable 40 milliGRAM(s) SubCutaneous daily  ezetimibe 10 milliGRAM(s) Oral daily  levothyroxine 25 MICROGram(s) Oral daily  multivitamin 1 Tablet(s) Oral daily  pantoprazole    Tablet 40 milliGRAM(s) Oral before breakfast  PARoxetine 50 milliGRAM(s) Oral daily  traZODone 50 milliGRAM(s) Oral at bedtime    MEDICATIONS  (PRN):  acetaminophen   Tablet. 650 milliGRAM(s) Oral every 6 hours PRN Moderate Pain (4 - 6)      Allergies    No Known Allergies    Intolerances        REVIEW OF SYSTEMS:  CONSTITUTIONAL: No fever or chills  HEENT:  No headache, no sore throat  RESPIRATORY: No cough, wheezing, or shortness of breath  CARDIOVASCULAR: No chest pain, palpitations, or leg swelling  GASTROINTESTINAL: No abd pain, nausea, vomiting, or diarrhea  GENITOURINARY: No dysuria, frequency, or hematuria  NEUROLOGICAL: no focal weakness or dizziness  MUSCULOSKELETAL: +right upper arm pain; +swelling in right forearm and hand     Vital Signs Last 24 Hrs  T(C): 37.7 (07 Mar 2018 21:29), Max: 37.7 (07 Mar 2018 21:29)  T(F): 99.8 (07 Mar 2018 21:29), Max: 99.8 (07 Mar 2018 21:29)  HR: 86 (07 Mar 2018 21:29) (86 - 95)  BP: 142/73 (07 Mar 2018 21:29) (109/63 - 142/73)  BP(mean): --  RR: 18 (07 Mar 2018 21:29) (15 - 18)  SpO2: 95% (07 Mar 2018 21:29) (91% - 96%)    PHYSICAL EXAM:  GENERAL: NAD at rest  HEENT:  EOMI, moist mucous membranes  CHEST/LUNG:  CTA b/l, no rales, wheezes, or rhonchi  HEART:  RRR, S1, S2  ABDOMEN:  BS+, soft, nontender, nondistended  EXTREMITIES: right arm in splint; worsening edema of the right forearm/hand, palpable radial pulse  NERVOUS SYSTEM: answers questions and follows commands appropriately    LABS:                        12.2   9.6   )-----------( 147      ( 07 Mar 2018 08:28 )             37.2     CBC Full  -  ( 07 Mar 2018 08:28 )  WBC Count : 9.6 K/uL  Hemoglobin : 12.2 g/dL  Hematocrit : 37.2 %  Platelet Count - Automated : 147 K/uL  Mean Cell Volume : 93.8 fl  Mean Cell Hemoglobin : 30.9 pg  Mean Cell Hemoglobin Concentration : 33.0 gm/dL  Auto Neutrophil # : x  Auto Lymphocyte # : x  Auto Monocyte # : x  Auto Eosinophil # : x  Auto Basophil # : x  Auto Neutrophil % : x  Auto Lymphocyte % : x  Auto Monocyte % : x  Auto Eosinophil % : x  Auto Basophil % : x    07 Mar 2018 08:28    138    |  102    |  21     ----------------------------<  96     4.4     |  30     |  0.78     Ca    9.7        07 Mar 2018 08:28    TPro  6.3    /  Alb  2.7    /  TBili  0.4    /  DBili  x      /  AST  110    /  ALT  104    /  AlkPhos  466    07 Mar 2018 08:28        CAPILLARY BLOOD GLUCOSE              RADIOLOGY & ADDITIONAL TESTS:    Personally reviewed.     Consultant(s) Notes Reviewed:  [x] YES  [ ] NO

## 2018-03-08 LAB
ALBUMIN SERPL ELPH-MCNC: 2.6 G/DL — LOW (ref 3.3–5)
ALP SERPL-CCNC: 442 U/L — HIGH (ref 40–120)
ALT FLD-CCNC: 83 U/L — HIGH (ref 12–78)
ANION GAP SERPL CALC-SCNC: 8 MMOL/L — SIGNIFICANT CHANGE UP (ref 5–17)
APPEARANCE UR: ABNORMAL
AST SERPL-CCNC: 60 U/L — HIGH (ref 15–37)
BACTERIA # UR AUTO: ABNORMAL
BILIRUB SERPL-MCNC: 0.4 MG/DL — SIGNIFICANT CHANGE UP (ref 0.2–1.2)
BILIRUB UR-MCNC: ABNORMAL
BUN SERPL-MCNC: 22 MG/DL — SIGNIFICANT CHANGE UP (ref 7–23)
CALCIUM SERPL-MCNC: 9.7 MG/DL — SIGNIFICANT CHANGE UP (ref 8.5–10.1)
CHLORIDE SERPL-SCNC: 103 MMOL/L — SIGNIFICANT CHANGE UP (ref 96–108)
CO2 SERPL-SCNC: 26 MMOL/L — SIGNIFICANT CHANGE UP (ref 22–31)
COLOR SPEC: YELLOW — SIGNIFICANT CHANGE UP
COMMENT - URINE: SIGNIFICANT CHANGE UP
CREAT SERPL-MCNC: 0.78 MG/DL — SIGNIFICANT CHANGE UP (ref 0.5–1.3)
DIFF PNL FLD: ABNORMAL
EPI CELLS # UR: SIGNIFICANT CHANGE UP
GLUCOSE SERPL-MCNC: 100 MG/DL — HIGH (ref 70–99)
GLUCOSE UR QL: NEGATIVE — SIGNIFICANT CHANGE UP
HCT VFR BLD CALC: 40.6 % — SIGNIFICANT CHANGE UP (ref 34.5–45)
HGB BLD-MCNC: 13.1 G/DL — SIGNIFICANT CHANGE UP (ref 11.5–15.5)
KETONES UR-MCNC: NEGATIVE — SIGNIFICANT CHANGE UP
LEUKOCYTE ESTERASE UR-ACNC: ABNORMAL
MCHC RBC-ENTMCNC: 30.4 PG — SIGNIFICANT CHANGE UP (ref 27–34)
MCHC RBC-ENTMCNC: 32.3 GM/DL — SIGNIFICANT CHANGE UP (ref 32–36)
MCV RBC AUTO: 94.1 FL — SIGNIFICANT CHANGE UP (ref 80–100)
NITRITE UR-MCNC: NEGATIVE — SIGNIFICANT CHANGE UP
PH UR: 5 — SIGNIFICANT CHANGE UP (ref 5–8)
PLATELET # BLD AUTO: 150 K/UL — SIGNIFICANT CHANGE UP (ref 150–400)
POTASSIUM SERPL-MCNC: 4.4 MMOL/L — SIGNIFICANT CHANGE UP (ref 3.5–5.3)
POTASSIUM SERPL-SCNC: 4.4 MMOL/L — SIGNIFICANT CHANGE UP (ref 3.5–5.3)
PROT SERPL-MCNC: 6.5 G/DL — SIGNIFICANT CHANGE UP (ref 6–8.3)
PROT UR-MCNC: 25 MG/DL
RBC # BLD: 4.31 M/UL — SIGNIFICANT CHANGE UP (ref 3.8–5.2)
RBC # FLD: 13.3 % — SIGNIFICANT CHANGE UP (ref 10.3–14.5)
RBC CASTS # UR COMP ASSIST: ABNORMAL /HPF (ref 0–4)
SODIUM SERPL-SCNC: 137 MMOL/L — SIGNIFICANT CHANGE UP (ref 135–145)
SP GR SPEC: 1.02 — SIGNIFICANT CHANGE UP (ref 1.01–1.02)
UROBILINOGEN FLD QL: NEGATIVE — SIGNIFICANT CHANGE UP
WBC # BLD: 9.1 K/UL — SIGNIFICANT CHANGE UP (ref 3.8–10.5)
WBC # FLD AUTO: 9.1 K/UL — SIGNIFICANT CHANGE UP (ref 3.8–10.5)
WBC UR QL: ABNORMAL

## 2018-03-08 PROCEDURE — 99233 SBSQ HOSP IP/OBS HIGH 50: CPT

## 2018-03-08 PROCEDURE — 71045 X-RAY EXAM CHEST 1 VIEW: CPT | Mod: 26

## 2018-03-08 RX ORDER — ACETAMINOPHEN 500 MG
650 TABLET ORAL EVERY 6 HOURS
Qty: 0 | Refills: 0 | Status: DISCONTINUED | OUTPATIENT
Start: 2018-03-08 | End: 2018-03-12

## 2018-03-08 RX ADMIN — Medication 25 MICROGRAM(S): at 05:38

## 2018-03-08 RX ADMIN — Medication 10 MILLIGRAM(S): at 13:26

## 2018-03-08 RX ADMIN — Medication 650 MILLIGRAM(S): at 15:30

## 2018-03-08 RX ADMIN — ENOXAPARIN SODIUM 40 MILLIGRAM(S): 100 INJECTION SUBCUTANEOUS at 13:26

## 2018-03-08 RX ADMIN — PANTOPRAZOLE SODIUM 40 MILLIGRAM(S): 20 TABLET, DELAYED RELEASE ORAL at 05:38

## 2018-03-08 RX ADMIN — Medication 50 MILLIGRAM(S): at 13:26

## 2018-03-08 RX ADMIN — Medication 81 MILLIGRAM(S): at 13:26

## 2018-03-08 RX ADMIN — Medication 1 TABLET(S): at 13:26

## 2018-03-08 RX ADMIN — EZETIMIBE 10 MILLIGRAM(S): 10 TABLET ORAL at 13:26

## 2018-03-08 NOTE — PROGRESS NOTE ADULT - SUBJECTIVE AND OBJECTIVE BOX
Patient is a 88y old  Female who presents with a chief complaint of fall and right arm pain. (05 Mar 2018 18:11)      INTERVAL HPI/OVERNIGHT EVENTS: Pt reports mild decrease in swelling in right forearm/hand. Felt generally unwell/weak earlier, now improved. Denies dysuria, cough, myalgias, nausea, vomiting, diarrhea, abd pain, headache, SOB, CP, palpitations. Pt unsure if she has more urinary frequency because she is incontinent in a diaper.       MEDICATIONS  (STANDING):  aspirin enteric coated 81 milliGRAM(s) Oral daily  atorvastatin 10 milliGRAM(s) Oral at bedtime  doxepin 10 milliGRAM(s) Oral daily  enoxaparin Injectable 40 milliGRAM(s) SubCutaneous daily  ezetimibe 10 milliGRAM(s) Oral daily  levothyroxine 25 MICROGram(s) Oral daily  multivitamin 1 Tablet(s) Oral daily  pantoprazole    Tablet 40 milliGRAM(s) Oral before breakfast  PARoxetine 50 milliGRAM(s) Oral daily  traZODone 50 milliGRAM(s) Oral at bedtime    MEDICATIONS  (PRN):  acetaminophen   Tablet 650 milliGRAM(s) Oral every 6 hours PRN For Temp greater than 38 C (100.4 F)  acetaminophen   Tablet. 650 milliGRAM(s) Oral every 6 hours PRN Moderate Pain (4 - 6)      Allergies    No Known Allergies    Intolerances        REVIEW OF SYSTEMS:  CONSTITUTIONAL: +malaise earlier today; No subjective fever or chills  HEENT:  No headache, no sore throat  RESPIRATORY: No cough, wheezing, or shortness of breath  CARDIOVASCULAR: No chest pain, palpitations, or leg swelling  GASTROINTESTINAL: No abd pain, nausea, vomiting, or diarrhea  GENITOURINARY: No dysuria, frequency, or hematuria  NEUROLOGICAL: no focal weakness or dizziness  MUSCULOSKELETAL: +right upper arm pain; +swelling in right forearm and hand     Vital Signs Last 24 Hrs  T(C): 38.8 (08 Mar 2018 15:25), Max: 38.8 (08 Mar 2018 15:25)  T(F): 101.9 (08 Mar 2018 15:25), Max: 101.9 (08 Mar 2018 15:25)  HR: 96 (08 Mar 2018 14:38) (91 - 98)  BP: 138/83 (08 Mar 2018 14:38) (106/62 - 138/83)  BP(mean): --  RR: 16 (08 Mar 2018 14:38) (16 - 18)  SpO2: 96% (08 Mar 2018 14:38) (95% - 96%)    PHYSICAL EXAM:  GENERAL: NAD at rest  HEENT:  EOMI, moist mucous membranes  CHEST/LUNG:  CTA b/l, no rales, wheezes, or rhonchi  HEART:  RRR, S1, S2  ABDOMEN:  BS+, soft, nontender, nondistended  EXTREMITIES: right arm in splint; worsening edema of the right forearm/hand, palpable radial pulse  NERVOUS SYSTEM: answers questions and follows commands appropriately    LABS:                        13.1   9.1   )-----------( 150      ( 08 Mar 2018 13:11 )             40.6     CBC Full  -  ( 08 Mar 2018 13:11 )  WBC Count : 9.1 K/uL  Hemoglobin : 13.1 g/dL  Hematocrit : 40.6 %  Platelet Count - Automated : 150 K/uL  Mean Cell Volume : 94.1 fl  Mean Cell Hemoglobin : 30.4 pg  Mean Cell Hemoglobin Concentration : 32.3 gm/dL  Auto Neutrophil # : 5.8 K/uL  Auto Lymphocyte # : 2.2 K/uL  Auto Monocyte # : 0.7 K/uL  Auto Eosinophil # : 0.2 K/uL  Auto Basophil # : 0.1 K/uL  Auto Neutrophil % : 64.2 %  Auto Lymphocyte % : 24.2 %  Auto Monocyte % : 8.2 %  Auto Eosinophil % : 2.5 %  Auto Basophil % : 0.9 %    08 Mar 2018 08:49    137    |  103    |  22     ----------------------------<  100    4.4     |  26     |  0.78     Ca    9.7        08 Mar 2018 08:49    TPro  6.5    /  Alb  2.6    /  TBili  0.4    /  DBili  x      /  AST  60     /  ALT  83     /  AlkPhos  442    08 Mar 2018 08:49      Urinalysis Basic - ( 08 Mar 2018 19:19 )    Color: Yellow / Appearance: Slightly Turbid / S.020 / pH: x  Gluc: x / Ketone: Negative  / Bili: Small / Urobili: Negative   Blood: x / Protein: 25 mg/dL / Nitrite: Negative   Leuk Esterase: Moderate / RBC: 3-5 /HPF / WBC 26-50   Sq Epi: x / Non Sq Epi: Occasional / Bacteria: Many      CAPILLARY BLOOD GLUCOSE              RADIOLOGY & ADDITIONAL TESTS:  CXR: no consolidation or significant effusion    Personally reviewed.     Consultant(s) Notes Reviewed:  [x] YES  [ ] NO Patient is a 88y old  Female who presents with a chief complaint of fall and right arm pain. (05 Mar 2018 18:11)    INTERVAL HPI/OVERNIGHT EVENTS: Pt reports mild decrease in swelling in right forearm/hand. Felt generally unwell/weak earlier, now improved. Denies dysuria, cough, myalgias, nausea, vomiting, diarrhea, abd pain, headache, SOB, CP, palpitations. Pt unsure if she has more urinary frequency because she is incontinent in a diaper.       MEDICATIONS  (STANDING):  aspirin enteric coated 81 milliGRAM(s) Oral daily  atorvastatin 10 milliGRAM(s) Oral at bedtime  doxepin 10 milliGRAM(s) Oral daily  enoxaparin Injectable 40 milliGRAM(s) SubCutaneous daily  ezetimibe 10 milliGRAM(s) Oral daily  levothyroxine 25 MICROGram(s) Oral daily  multivitamin 1 Tablet(s) Oral daily  pantoprazole    Tablet 40 milliGRAM(s) Oral before breakfast  PARoxetine 50 milliGRAM(s) Oral daily  traZODone 50 milliGRAM(s) Oral at bedtime    MEDICATIONS  (PRN):  acetaminophen   Tablet 650 milliGRAM(s) Oral every 6 hours PRN For Temp greater than 38 C (100.4 F)  acetaminophen   Tablet. 650 milliGRAM(s) Oral every 6 hours PRN Moderate Pain (4 - 6)      Allergies    No Known Allergies    Intolerances        REVIEW OF SYSTEMS:  CONSTITUTIONAL: +malaise earlier today; No subjective fever or chills  HEENT:  No headache, no sore throat  RESPIRATORY: No cough, wheezing, or shortness of breath  CARDIOVASCULAR: No chest pain, palpitations, or leg swelling  GASTROINTESTINAL: No abd pain, nausea, vomiting, or diarrhea  GENITOURINARY: No dysuria, frequency, or hematuria  NEUROLOGICAL: no focal weakness or dizziness  MUSCULOSKELETAL: +right upper arm pain; +swelling in right forearm and hand     Vital Signs Last 24 Hrs  T(C): 38.8 (08 Mar 2018 15:25), Max: 38.8 (08 Mar 2018 15:25)  T(F): 101.9 (08 Mar 2018 15:25), Max: 101.9 (08 Mar 2018 15:25)  HR: 96 (08 Mar 2018 14:38) (91 - 98)  BP: 138/83 (08 Mar 2018 14:38) (106/62 - 138/83)  BP(mean): --  RR: 16 (08 Mar 2018 14:38) (16 - 18)  SpO2: 96% (08 Mar 2018 14:38) (95% - 96%)    PHYSICAL EXAM:  GENERAL: NAD at rest  HEENT:  EOMI, moist mucous membranes  CHEST/LUNG:  CTA b/l, no rales, wheezes, or rhonchi  HEART:  RRR, S1, S2  ABDOMEN:  BS+, soft, nontender, nondistended  EXTREMITIES: right arm in splint; worsening edema of the right forearm/hand, palpable radial pulse  NERVOUS SYSTEM: answers questions and follows commands appropriately    LABS:                        13.1   9.1   )-----------( 150      ( 08 Mar 2018 13:11 )             40.6     CBC Full  -  ( 08 Mar 2018 13:11 )  WBC Count : 9.1 K/uL  Hemoglobin : 13.1 g/dL  Hematocrit : 40.6 %  Platelet Count - Automated : 150 K/uL  Mean Cell Volume : 94.1 fl  Mean Cell Hemoglobin : 30.4 pg  Mean Cell Hemoglobin Concentration : 32.3 gm/dL  Auto Neutrophil # : 5.8 K/uL  Auto Lymphocyte # : 2.2 K/uL  Auto Monocyte # : 0.7 K/uL  Auto Eosinophil # : 0.2 K/uL  Auto Basophil # : 0.1 K/uL  Auto Neutrophil % : 64.2 %  Auto Lymphocyte % : 24.2 %  Auto Monocyte % : 8.2 %  Auto Eosinophil % : 2.5 %  Auto Basophil % : 0.9 %    08 Mar 2018 08:49    137    |  103    |  22     ----------------------------<  100    4.4     |  26     |  0.78     Ca    9.7        08 Mar 2018 08:49    TPro  6.5    /  Alb  2.6    /  TBili  0.4    /  DBili  x      /  AST  60     /  ALT  83     /  AlkPhos  442    08 Mar 2018 08:49      Urinalysis Basic - ( 08 Mar 2018 19:19 )    Color: Yellow / Appearance: Slightly Turbid / S.020 / pH: x  Gluc: x / Ketone: Negative  / Bili: Small / Urobili: Negative   Blood: x / Protein: 25 mg/dL / Nitrite: Negative   Leuk Esterase: Moderate / RBC: 3-5 /HPF / WBC 26-50   Sq Epi: x / Non Sq Epi: Occasional / Bacteria: Many      CAPILLARY BLOOD GLUCOSE              RADIOLOGY & ADDITIONAL TESTS:  CXR: no consolidation or significant effusion    Personally reviewed.     Consultant(s) Notes Reviewed:  [x] YES  [ ] NO

## 2018-03-08 NOTE — PROGRESS NOTE ADULT - PROBLEM SELECTOR PLAN 8
c/w home iron, will bring in from home  was not anemic on admission; the drop was likely due to some blood loss from fracture

## 2018-03-09 DIAGNOSIS — M79.89 OTHER SPECIFIED SOFT TISSUE DISORDERS: ICD-10-CM

## 2018-03-09 DIAGNOSIS — R50.9 FEVER, UNSPECIFIED: ICD-10-CM

## 2018-03-09 LAB
ALBUMIN SERPL ELPH-MCNC: 2.4 G/DL — LOW (ref 3.3–5)
ALP SERPL-CCNC: 387 U/L — HIGH (ref 40–120)
ALT FLD-CCNC: 67 U/L — SIGNIFICANT CHANGE UP (ref 12–78)
ANION GAP SERPL CALC-SCNC: 8 MMOL/L — SIGNIFICANT CHANGE UP (ref 5–17)
AST SERPL-CCNC: 54 U/L — HIGH (ref 15–37)
BILIRUB SERPL-MCNC: 0.7 MG/DL — SIGNIFICANT CHANGE UP (ref 0.2–1.2)
BUN SERPL-MCNC: 31 MG/DL — HIGH (ref 7–23)
CALCIUM SERPL-MCNC: 9.2 MG/DL — SIGNIFICANT CHANGE UP (ref 8.5–10.1)
CHLORIDE SERPL-SCNC: 105 MMOL/L — SIGNIFICANT CHANGE UP (ref 96–108)
CO2 SERPL-SCNC: 22 MMOL/L — SIGNIFICANT CHANGE UP (ref 22–31)
CREAT SERPL-MCNC: 0.94 MG/DL — SIGNIFICANT CHANGE UP (ref 0.5–1.3)
GLUCOSE SERPL-MCNC: 95 MG/DL — SIGNIFICANT CHANGE UP (ref 70–99)
POTASSIUM SERPL-MCNC: 4.3 MMOL/L — SIGNIFICANT CHANGE UP (ref 3.5–5.3)
POTASSIUM SERPL-SCNC: 4.3 MMOL/L — SIGNIFICANT CHANGE UP (ref 3.5–5.3)
PROT SERPL-MCNC: 6.3 G/DL — SIGNIFICANT CHANGE UP (ref 6–8.3)
SODIUM SERPL-SCNC: 135 MMOL/L — SIGNIFICANT CHANGE UP (ref 135–145)

## 2018-03-09 PROCEDURE — 76705 ECHO EXAM OF ABDOMEN: CPT | Mod: 26

## 2018-03-09 PROCEDURE — 99233 SBSQ HOSP IP/OBS HIGH 50: CPT

## 2018-03-09 RX ORDER — CEFTRIAXONE 500 MG/1
1 INJECTION, POWDER, FOR SOLUTION INTRAMUSCULAR; INTRAVENOUS ONCE
Qty: 0 | Refills: 0 | Status: COMPLETED | OUTPATIENT
Start: 2018-03-09 | End: 2018-03-09

## 2018-03-09 RX ORDER — CEFTRIAXONE 500 MG/1
INJECTION, POWDER, FOR SOLUTION INTRAMUSCULAR; INTRAVENOUS
Qty: 0 | Refills: 0 | Status: DISCONTINUED | OUTPATIENT
Start: 2018-03-09 | End: 2018-03-11

## 2018-03-09 RX ORDER — CEFTRIAXONE 500 MG/1
1 INJECTION, POWDER, FOR SOLUTION INTRAMUSCULAR; INTRAVENOUS EVERY 24 HOURS
Qty: 0 | Refills: 0 | Status: DISCONTINUED | OUTPATIENT
Start: 2018-03-10 | End: 2018-03-11

## 2018-03-09 RX ORDER — SODIUM CHLORIDE 9 MG/ML
1000 INJECTION INTRAMUSCULAR; INTRAVENOUS; SUBCUTANEOUS
Qty: 0 | Refills: 0 | Status: DISCONTINUED | OUTPATIENT
Start: 2018-03-09 | End: 2018-03-10

## 2018-03-09 RX ADMIN — Medication 650 MILLIGRAM(S): at 19:12

## 2018-03-09 RX ADMIN — Medication 1 TABLET(S): at 12:17

## 2018-03-09 RX ADMIN — PANTOPRAZOLE SODIUM 40 MILLIGRAM(S): 20 TABLET, DELAYED RELEASE ORAL at 05:27

## 2018-03-09 RX ADMIN — SODIUM CHLORIDE 75 MILLILITER(S): 9 INJECTION INTRAMUSCULAR; INTRAVENOUS; SUBCUTANEOUS at 17:12

## 2018-03-09 RX ADMIN — Medication 50 MILLIGRAM(S): at 12:17

## 2018-03-09 RX ADMIN — Medication 10 MILLIGRAM(S): at 12:17

## 2018-03-09 RX ADMIN — EZETIMIBE 10 MILLIGRAM(S): 10 TABLET ORAL at 12:18

## 2018-03-09 RX ADMIN — ATORVASTATIN CALCIUM 10 MILLIGRAM(S): 80 TABLET, FILM COATED ORAL at 21:19

## 2018-03-09 RX ADMIN — CEFTRIAXONE 100 GRAM(S): 500 INJECTION, POWDER, FOR SOLUTION INTRAMUSCULAR; INTRAVENOUS at 17:12

## 2018-03-09 RX ADMIN — Medication 81 MILLIGRAM(S): at 12:17

## 2018-03-09 RX ADMIN — Medication 25 MICROGRAM(S): at 05:27

## 2018-03-09 RX ADMIN — Medication 50 MILLIGRAM(S): at 21:19

## 2018-03-09 RX ADMIN — Medication 650 MILLIGRAM(S): at 20:12

## 2018-03-09 RX ADMIN — ENOXAPARIN SODIUM 40 MILLIGRAM(S): 100 INJECTION SUBCUTANEOUS at 12:16

## 2018-03-09 NOTE — PROGRESS NOTE ADULT - NSHPATTENDINGPLANDISCUSS_GEN_ALL_CORE
pt, pt's family, consultants

## 2018-03-09 NOTE — PROGRESS NOTE ADULT - PROBLEM SELECTOR PLAN 7
c/w protonix
c/w home ezetimibe, brought in from home
c/w protonix
likely this is the bone subset given the acute fracture  t bili normal; no biliary symptoms

## 2018-03-09 NOTE — PROGRESS NOTE ADULT - PROBLEM SELECTOR PROBLEM 7
GERD (gastroesophageal reflux disease)
Alkaline phosphatase elevation
GERD (gastroesophageal reflux disease)
Hypercholesteremia

## 2018-03-09 NOTE — PROGRESS NOTE ADULT - SUBJECTIVE AND OBJECTIVE BOX
Pt S/E at bedside, no acute events overnight, pain controlled                          11.4   9.8   )-----------( 168      ( 09 Mar 2018 08:14 )             34.3     09 Mar 2018 06:59    135    |  105    |  31     ----------------------------<  95     4.3     |  22     |  0.94     Ca    9.2        09 Mar 2018 06:59    TPro  6.3    /  Alb  2.4    /  TBili  0.7    /  DBili  x      /  AST  54     /  ALT  67     /  AlkPhos  387    09 Mar 2018 06:59      Vital Signs Last 24 Hrs  T(C): 36.8 (03-09-18 @ 05:03), Max: 38.8 (03-08-18 @ 15:25)  T(F): 98.3 (03-09-18 @ 05:03), Max: 101.9 (03-08-18 @ 15:25)  HR: 91 (03-09-18 @ 05:03) (91 - 96)  BP: 136/80 (03-09-18 @ 05:03) (106/62 - 138/83)  BP(mean): --  RR: 17 (03-09-18 @ 05:03) (16 - 18)  SpO2: 96% (03-09-18 @ 05:03) (95% - 96%)    Gen: NAD,     Right Upper Extremity:  Dressing clean dry intact, splint in place, pitting edema isolated to right hand, no warmth or erythema,  +AIN/PIN/M/R/U/Msc/Ax  SILT C5-T1  +Radial Pulse  Compartments soft  No calf TTP B/L

## 2018-03-09 NOTE — CONSULT NOTE ADULT - SUBJECTIVE AND OBJECTIVE BOX
HPI:  87yo female with pmhx of anxiety, depression, diverticulosis, GERD, hypercholesterolemia, hypothyroidism iron deficiency, raynaud disease, presents with right arm pain s/p fall this morning. Patient states she consuelo to get out of bed, reached for her walker, and fell onto the right side of her body. Patient denies hitting her head or LOC. She felt immediate constant right arm pain and was BIBEMS to [facility] ED for further medical evaluation. Xray exhibited fracture of the right mid humeral shaft with overlapping fracture fragments and varus angulation at the fracture apex. Orthopedic surgery consult evaluated the patient, performed a closed reduction and placed right arm in padded coaptation splint. Patient was evaluated by Physical Therapy, who determined patient must go to rehabilitation facility prior to returning home. (05 Mar 2018 13:09)    Patient reports no cough, no dyspnea, no abd pain, no diarrhea, no urinary symptoms. She does report that in general she is stable and does not fall so there was a change reported in baseline leading to fall.      PAST MEDICAL & SURGICAL HISTORY:  Diverticulosis  Raynaud disease  Iron deficiency anemia  Anxiety  Hypercholesteremia  Hypothyroid  GERD (gastroesophageal reflux disease)  Depression  Furuncle of ankle: rt side s/p Sx, skin graft  S/P tonsillectomy  History of cataract surgery: bilaterally  H/O total hysterectomy  History of cholecystectomy  History of bilateral knee replacement      Antimicrobials      Immunological      Other  acetaminophen   Tablet 650 milliGRAM(s) Oral every 6 hours PRN  acetaminophen   Tablet. 650 milliGRAM(s) Oral every 6 hours PRN  aspirin enteric coated 81 milliGRAM(s) Oral daily  atorvastatin 10 milliGRAM(s) Oral at bedtime  doxepin 10 milliGRAM(s) Oral daily  enoxaparin Injectable 40 milliGRAM(s) SubCutaneous daily  ezetimibe 10 milliGRAM(s) Oral daily  levothyroxine 25 MICROGram(s) Oral daily  multivitamin 1 Tablet(s) Oral daily  pantoprazole    Tablet 40 milliGRAM(s) Oral before breakfast  PARoxetine 50 milliGRAM(s) Oral daily  traZODone 50 milliGRAM(s) Oral at bedtime      Allergies    No Known Allergies    Intolerances    SOCIAL HISTORY: no toxic habits reported    FAMILY HISTORY:  No pertinent family history in first degree relatives      ROS:    EYES:  Negative  blurry vision or double vision  GASTROINTESTINAL:  Negative for nausea, vomiting, diarrhea  -otherwise negative except for subjective    Vital Signs Last 24 Hrs  T(C): 36.8 (09 Mar 2018 05:03), Max: 38.8 (08 Mar 2018 15:25)  T(F): 98.3 (09 Mar 2018 05:03), Max: 101.9 (08 Mar 2018 15:25)  HR: 91 (09 Mar 2018 05:03) (91 - 96)  BP: 136/80 (09 Mar 2018 05:03) (106/62 - 138/83)  BP(mean): --  RR: 17 (09 Mar 2018 05:03) (16 - 18)  SpO2: 96% (09 Mar 2018 05:03) (95% - 96%)    PE:  WDWN in no distress  HEENT:  NC, PERRL, sclerae anicteric, conjunctivae clear, EOMI.  Sinuses nontender, no nasal exudate.  No buccal or pharyngeal lesions, erythema or exudate  Neck:  Supple, no adenopathy  Lungs:  No accessory muscle use, bilaterally clear to auscultation  Cor:  RRR, S1, S2, no murmur appreciated  Abd:  Symmetric, normoactive BS.  Soft, nontender, no masses, guarding or rebound.  Liver and spleen not enlarged  Extrem:  right arm swollen and splinted  Skin:  chronic appearing LE skin darkening  Neuro: grossly intact  Musc: moving limbs freely (except right arm), no focal deficits    LABS:                        11.4   9.8   )-----------( 168      ( 09 Mar 2018 08:14 )             34.3       WBC Count: 9.8 K/uL (18 @ 08:14)  WBC Count: 9.1 K/uL (18 @ 13:11)  WBC Count: 9.6 K/uL (18 @ 08:28)  WBC Count: 8.4 K/uL (18 @ 10:49)  WBC Count: 8.5 K/uL (18 @ 09:37)          135  |  105  |  31<H>  ----------------------------<  95  4.3   |  22  |  0.94    Ca    9.2      09 Mar 2018 06:59    TPro  6.3  /  Alb  2.4<L>  /  TBili  0.7  /  DBili  x   /  AST  54<H>  /  ALT  67  /  AlkPhos  387<H>        Creatinine, Serum: 0.94 mg/dL (18 @ 06:59)  Creatinine, Serum: 0.78 mg/dL (18 @ 08:49)  Creatinine, Serum: 0.78 mg/dL (18 @ 08:28)  Creatinine, Serum: 0.73 mg/dL (18 @ 10:49)  Creatinine, Serum: 0.66 mg/dL (18 @ 09:37)      Urinalysis Basic - ( 08 Mar 2018 19:19 )    Color: Yellow / Appearance: Slightly Turbid / S.020 / pH: x  Gluc: x / Ketone: Negative  / Bili: Small / Urobili: Negative   Blood: x / Protein: 25 mg/dL / Nitrite: Negative   Leuk Esterase: Moderate / RBC: 3-5 /HPF / WBC 26-50   Sq Epi: x / Non Sq Epi: Occasional / Bacteria: Many      MICROBIOLOGY:        RADIOLOGY & ADDITIONAL STUDIES:    --< from: Xray Chest 1 View AP/PA (18 @ 17:47) >    EXAM:  XR CHEST AP OR PA 1V                            PROCEDURE DATE:  2018          INTERPRETATION:  Chest, single portable view    HISTORY: [id number] F a/w fall, right humeral fracture, now with fever, no   significant symptoms    Comparison:     IMPRESSION:    Support Devices: None  Heart: Cardiomegaly  Mediastinum:  Unremarkable  Lungs/Airways: No consolidation or pleural effusion right base   subsegmental atelectasis. No pneumothorax.  Bones/Soft tissues: Unremarkable

## 2018-03-09 NOTE — PROGRESS NOTE ADULT - SUBJECTIVE AND OBJECTIVE BOX
Patient is a 88y old  Female who presents with a chief complaint of fall (05 Mar 2018 18:11)      INTERVAL HPI/OVERNIGHT EVENTS: pt states she feels generally fatigued. Denies fever, chills. Denies dysuria, cough, myalgias, nausea, vomiting, diarrhea, abd pain, headache, SOB, CP, palpitations. Pt unsure if she has more urinary frequency because she is incontinent in a diaper. As per nursing, pt was much more disoriented overnight.       MEDICATIONS  (STANDING):  aspirin enteric coated 81 milliGRAM(s) Oral daily  atorvastatin 10 milliGRAM(s) Oral at bedtime  cefTRIAXone   IVPB      cefTRIAXone   IVPB 1 Gram(s) IV Intermittent every 24 hours  doxepin 10 milliGRAM(s) Oral daily  enoxaparin Injectable 40 milliGRAM(s) SubCutaneous daily  ezetimibe 10 milliGRAM(s) Oral daily  levothyroxine 25 MICROGram(s) Oral daily  multivitamin 1 Tablet(s) Oral daily  pantoprazole    Tablet 40 milliGRAM(s) Oral before breakfast  PARoxetine 50 milliGRAM(s) Oral daily  sodium chloride 0.9%. 1000 milliLiter(s) (75 mL/Hr) IV Continuous <Continuous>  traZODone 50 milliGRAM(s) Oral at bedtime    MEDICATIONS  (PRN):  acetaminophen   Tablet 650 milliGRAM(s) Oral every 6 hours PRN For Temp greater than 38 C (100.4 F)  acetaminophen   Tablet. 650 milliGRAM(s) Oral every 6 hours PRN Moderate Pain (4 - 6)      Allergies    No Known Allergies    Intolerances        REVIEW OF SYSTEMS:  CONSTITUTIONAL: +malaise; No fever or chills  HEENT:  No headache, no sore throat  RESPIRATORY: No cough, wheezing, or shortness of breath  CARDIOVASCULAR: No chest pain, palpitations, or leg swelling  GASTROINTESTINAL: No abd pain, nausea, vomiting, or diarrhea  GENITOURINARY: No dysuria, frequency, or hematuria  NEUROLOGICAL: no focal weakness or dizziness  MUSCULOSKELETAL: +right upper arm pain; +swelling in right forearm and hand     Vital Signs Last 24 Hrs  T(C): 36.8 (09 Mar 2018 05:03), Max: 38.8 (08 Mar 2018 15:25)  T(F): 98.3 (09 Mar 2018 05:03), Max: 101.9 (08 Mar 2018 15:25)  HR: 91 (09 Mar 2018 05:03) (91 - 96)  BP: 136/80 (09 Mar 2018 05:03) (106/62 - 138/83)  BP(mean): --  RR: 17 (09 Mar 2018 05:03) (16 - 18)  SpO2: 96% (09 Mar 2018 05:03) (95% - 96%)    PHYSICAL EXAM:  GENERAL: appears fatigued, not opening eyes for interview unless asked and then quickly closes them  HEENT:  anicteric, moist mucous membranes  CHEST/LUNG:  CTA b/l, no rales, wheezes, or rhonchi  HEART:  RRR, S1, S2  ABDOMEN:  BS+, soft, nontender, nondistended  EXTREMITIES: right arm in coaptation splint; significant edema of the right forearm/hand (improved since yesterday), palpable radial pulse, no significant tenderness in the hand/forearm; no cyanosis, or calf tenderness  NERVOUS SYSTEM: answers simple questions; AA&Ox1-2 (alert to self and that she is in a hospital, but unsure which hospital and not oriented to date), sensation to light touch intact in R UE    LABS:                        11.4   9.8   )-----------( 168      ( 09 Mar 2018 08:14 )             34.3     CBC Full  -  ( 09 Mar 2018 08:14 )  WBC Count : 9.8 K/uL  Hemoglobin : 11.4 g/dL  Hematocrit : 34.3 %  Platelet Count - Automated : 168 K/uL  Mean Cell Volume : 92.3 fl  Mean Cell Hemoglobin : 30.8 pg  Mean Cell Hemoglobin Concentration : 33.3 gm/dL  Auto Neutrophil # : x  Auto Lymphocyte # : x  Auto Monocyte # : x  Auto Eosinophil # : x  Auto Basophil # : x  Auto Neutrophil % : 71.0 %  Auto Lymphocyte % : 11.0 %  Auto Monocyte % : 9.0 %  Auto Eosinophil % : 4.0 %  Auto Basophil % : x    09 Mar 2018 06:59    135    |  105    |  31     ----------------------------<  95     4.3     |  22     |  0.94     Ca    9.2        09 Mar 2018 06:59    TPro  6.3    /  Alb  2.4    /  TBili  0.7    /  DBili  x      /  AST  54     /  ALT  67     /  AlkPhos  387    09 Mar 2018 06:59      Urinalysis Basic - ( 08 Mar 2018 19:19 )    Color: Yellow / Appearance: Slightly Turbid / S.020 / pH: x  Gluc: x / Ketone: Negative  / Bili: Small / Urobili: Negative   Blood: x / Protein: 25 mg/dL / Nitrite: Negative   Leuk Esterase: Moderate / RBC: 3-5 /HPF / WBC 26-50   Sq Epi: x / Non Sq Epi: Occasional / Bacteria: Many      CAPILLARY BLOOD GLUCOSE            Culture - Blood (collected 18 @ 23:35)  Source: .Blood Blood-Peripheral  Preliminary Report (03-10-18 @ 01:03):    No growth to date.    Culture - Blood (collected 18 @ 23:35)  Source: .Blood Blood  Preliminary Report (03-10-18 @ 01:03):    No growth to date.    Culture - Urine (collected 18 @ 22:56)  Source: .Urine Catheterized  Preliminary Report (18 @ 16:12):    >100,000 CFU/ml Escherichia coli        RADIOLOGY & ADDITIONAL TESTS:    Personally reviewed.     Consultant(s) Notes Reviewed:  [x] YES  [ ] NO Patient is a 88y old  Female who presents with a chief complaint of fall (05 Mar 2018 18:11    INTERVAL HPI/OVERNIGHT EVENTS: pt states she feels generally fatigued. Denies fever, chills. Denies dysuria, cough, myalgias, nausea, vomiting, diarrhea, abd pain, headache, SOB, CP, palpitations. Pt unsure if she has more urinary frequency because she is incontinent in a diaper. As per nursing, pt was much more disoriented overnight.       MEDICATIONS  (STANDING):  aspirin enteric coated 81 milliGRAM(s) Oral daily  atorvastatin 10 milliGRAM(s) Oral at bedtime  cefTRIAXone   IVPB      cefTRIAXone   IVPB 1 Gram(s) IV Intermittent every 24 hours  doxepin 10 milliGRAM(s) Oral daily  enoxaparin Injectable 40 milliGRAM(s) SubCutaneous daily  ezetimibe 10 milliGRAM(s) Oral daily  levothyroxine 25 MICROGram(s) Oral daily  multivitamin 1 Tablet(s) Oral daily  pantoprazole    Tablet 40 milliGRAM(s) Oral before breakfast  PARoxetine 50 milliGRAM(s) Oral daily  sodium chloride 0.9%. 1000 milliLiter(s) (75 mL/Hr) IV Continuous <Continuous>  traZODone 50 milliGRAM(s) Oral at bedtime    MEDICATIONS  (PRN):  acetaminophen   Tablet 650 milliGRAM(s) Oral every 6 hours PRN For Temp greater than 38 C (100.4 F)  acetaminophen   Tablet. 650 milliGRAM(s) Oral every 6 hours PRN Moderate Pain (4 - 6)      Allergies    No Known Allergies    Intolerances        REVIEW OF SYSTEMS:  CONSTITUTIONAL: +malaise; No fever or chills  HEENT:  No headache, no sore throat  RESPIRATORY: No cough, wheezing, or shortness of breath  CARDIOVASCULAR: No chest pain, palpitations, or leg swelling  GASTROINTESTINAL: No abd pain, nausea, vomiting, or diarrhea  GENITOURINARY: No dysuria, frequency, or hematuria  NEUROLOGICAL: no focal weakness or dizziness  MUSCULOSKELETAL: +right upper arm pain; +swelling in right forearm and hand     Vital Signs Last 24 Hrs  T(C): 36.8 (09 Mar 2018 05:03), Max: 38.8 (08 Mar 2018 15:25)  T(F): 98.3 (09 Mar 2018 05:03), Max: 101.9 (08 Mar 2018 15:25)  HR: 91 (09 Mar 2018 05:03) (91 - 96)  BP: 136/80 (09 Mar 2018 05:03) (106/62 - 138/83)  BP(mean): --  RR: 17 (09 Mar 2018 05:03) (16 - 18)  SpO2: 96% (09 Mar 2018 05:03) (95% - 96%)    PHYSICAL EXAM:  GENERAL: appears fatigued, not opening eyes for interview unless asked and then quickly closes them  HEENT:  anicteric, moist mucous membranes  CHEST/LUNG:  CTA b/l, no rales, wheezes, or rhonchi  HEART:  RRR, S1, S2  ABDOMEN:  BS+, soft, nontender, nondistended  EXTREMITIES: right arm in coaptation splint; significant edema of the right forearm/hand (improved since yesterday), palpable radial pulse, no significant tenderness in the hand/forearm; no cyanosis, or calf tenderness  NERVOUS SYSTEM: answers simple questions; AA&Ox1-2 (alert to self and that she is in a hospital, but unsure which hospital and not oriented to date), sensation to light touch intact in R UE    LABS:                        11.4   9.8   )-----------( 168      ( 09 Mar 2018 08:14 )             34.3     CBC Full  -  ( 09 Mar 2018 08:14 )  WBC Count : 9.8 K/uL  Hemoglobin : 11.4 g/dL  Hematocrit : 34.3 %  Platelet Count - Automated : 168 K/uL  Mean Cell Volume : 92.3 fl  Mean Cell Hemoglobin : 30.8 pg  Mean Cell Hemoglobin Concentration : 33.3 gm/dL  Auto Neutrophil # : x  Auto Lymphocyte # : x  Auto Monocyte # : x  Auto Eosinophil # : x  Auto Basophil # : x  Auto Neutrophil % : 71.0 %  Auto Lymphocyte % : 11.0 %  Auto Monocyte % : 9.0 %  Auto Eosinophil % : 4.0 %  Auto Basophil % : x    09 Mar 2018 06:59    135    |  105    |  31     ----------------------------<  95     4.3     |  22     |  0.94     Ca    9.2        09 Mar 2018 06:59    TPro  6.3    /  Alb  2.4    /  TBili  0.7    /  DBili  x      /  AST  54     /  ALT  67     /  AlkPhos  387    09 Mar 2018 06:59      Urinalysis Basic - ( 08 Mar 2018 19:19 )    Color: Yellow / Appearance: Slightly Turbid / S.020 / pH: x  Gluc: x / Ketone: Negative  / Bili: Small / Urobili: Negative   Blood: x / Protein: 25 mg/dL / Nitrite: Negative   Leuk Esterase: Moderate / RBC: 3-5 /HPF / WBC 26-50   Sq Epi: x / Non Sq Epi: Occasional / Bacteria: Many      CAPILLARY BLOOD GLUCOSE            Culture - Blood (collected 18 @ 23:35)  Source: .Blood Blood-Peripheral  Preliminary Report (03-10-18 @ 01:03):    No growth to date.    Culture - Blood (collected 18 @ 23:35)  Source: .Blood Blood  Preliminary Report (03-10-18 @ 01:03):    No growth to date.    Culture - Urine (collected 18 @ 22:56)  Source: .Urine Catheterized  Preliminary Report (18 @ 16:12):    >100,000 CFU/ml Escherichia coli        RADIOLOGY & ADDITIONAL TESTS:    Personally reviewed.     Consultant(s) Notes Reviewed:  [x] YES  [ ] NO

## 2018-03-09 NOTE — PROGRESS NOTE ADULT - PROBLEM SELECTOR PLAN 6
c/w trazadone, doxepin, paroxetine
c/w home ezetimibe, brought in from home
c/w home iron, will bring in from home  was not anemia on admission; the drop was likely due to some blood loss from fracture
c/w trazadone, doxepin, paroxetine

## 2018-03-09 NOTE — CONSULT NOTE ADULT - PROBLEM SELECTOR RECOMMENDATION 2
per ortho/medicine.    Thank you for consulting us and involving us in the management of this most interesting and challenging case.     We will follow along in the care of this patient.    Over the weekend Dr. Eduar Kevin will be covering for our group. If you have any questions please reach out to him at [phone].

## 2018-03-09 NOTE — CONSULT NOTE ADULT - ASSESSMENT
89 yo female reporting fall and right humerus fracture, noted to have fever with no obv localization but does have abn UA

## 2018-03-09 NOTE — PROGRESS NOTE ADULT - PROBLEM SELECTOR PLAN 5
c/w Synthroid
c/w Synthroid
c/w home iron, will bring in from home  was not anemia on admission; the drop was likely due to some blood loss from fracture
c/w protonix

## 2018-03-09 NOTE — PROGRESS NOTE ADULT - PROBLEM SELECTOR PLAN 8
c/w home iron, will bring in from home  -was not anemic on admission; the drop likely due to some blood loss from fracture.  -arm is less swollen today; recheck H&H tomorrow and if continues to decrease consider further imaging.   -no melena or hematochezia reported

## 2018-03-10 ENCOUNTER — TRANSCRIPTION ENCOUNTER (OUTPATIENT)
Age: 83
End: 2018-03-10

## 2018-03-10 LAB
-  AMIKACIN: SIGNIFICANT CHANGE UP
-  AMPICILLIN/SULBACTAM: SIGNIFICANT CHANGE UP
-  AMPICILLIN: SIGNIFICANT CHANGE UP
-  AZTREONAM: SIGNIFICANT CHANGE UP
-  CEFAZOLIN: SIGNIFICANT CHANGE UP
-  CEFEPIME: SIGNIFICANT CHANGE UP
-  CEFOXITIN: SIGNIFICANT CHANGE UP
-  CEFTAZIDIME: SIGNIFICANT CHANGE UP
-  CEFTRIAXONE: SIGNIFICANT CHANGE UP
-  CIPROFLOXACIN: SIGNIFICANT CHANGE UP
-  ERTAPENEM: SIGNIFICANT CHANGE UP
-  GENTAMICIN: SIGNIFICANT CHANGE UP
-  IMIPENEM: SIGNIFICANT CHANGE UP
-  LEVOFLOXACIN: SIGNIFICANT CHANGE UP
-  MEROPENEM: SIGNIFICANT CHANGE UP
-  NITROFURANTOIN: SIGNIFICANT CHANGE UP
-  PIPERACILLIN/TAZOBACTAM: SIGNIFICANT CHANGE UP
-  TOBRAMYCIN: SIGNIFICANT CHANGE UP
-  TRIMETHOPRIM/SULFAMETHOXAZOLE: SIGNIFICANT CHANGE UP
ANION GAP SERPL CALC-SCNC: 5 MMOL/L — SIGNIFICANT CHANGE UP (ref 5–17)
BASOPHILS # BLD AUTO: 0.1 K/UL — SIGNIFICANT CHANGE UP (ref 0–0.2)
BASOPHILS NFR BLD AUTO: 1.2 % — SIGNIFICANT CHANGE UP (ref 0–2)
BUN SERPL-MCNC: 26 MG/DL — HIGH (ref 7–23)
CALCIUM SERPL-MCNC: 9.1 MG/DL — SIGNIFICANT CHANGE UP (ref 8.5–10.1)
CHLORIDE SERPL-SCNC: 108 MMOL/L — SIGNIFICANT CHANGE UP (ref 96–108)
CO2 SERPL-SCNC: 30 MMOL/L — SIGNIFICANT CHANGE UP (ref 22–31)
CREAT SERPL-MCNC: 0.77 MG/DL — SIGNIFICANT CHANGE UP (ref 0.5–1.3)
CULTURE RESULTS: SIGNIFICANT CHANGE UP
EOSINOPHIL # BLD AUTO: 0.6 K/UL — HIGH (ref 0–0.5)
EOSINOPHIL NFR BLD AUTO: 7.2 % — HIGH (ref 0–6)
GLUCOSE SERPL-MCNC: 92 MG/DL — SIGNIFICANT CHANGE UP (ref 70–99)
HCT VFR BLD CALC: 31.2 % — LOW (ref 34.5–45)
HCT VFR BLD CALC: 37.3 % — SIGNIFICANT CHANGE UP (ref 34.5–45)
HGB BLD-MCNC: 10.4 G/DL — LOW (ref 11.5–15.5)
HGB BLD-MCNC: 12.3 G/DL — SIGNIFICANT CHANGE UP (ref 11.5–15.5)
LYMPHOCYTES # BLD AUTO: 2.1 K/UL — SIGNIFICANT CHANGE UP (ref 1–3.3)
LYMPHOCYTES # BLD AUTO: 26.1 % — SIGNIFICANT CHANGE UP (ref 13–44)
MCHC RBC-ENTMCNC: 31 PG — SIGNIFICANT CHANGE UP (ref 27–34)
MCHC RBC-ENTMCNC: 33.4 GM/DL — SIGNIFICANT CHANGE UP (ref 32–36)
MCV RBC AUTO: 92.8 FL — SIGNIFICANT CHANGE UP (ref 80–100)
METHOD TYPE: SIGNIFICANT CHANGE UP
MONOCYTES # BLD AUTO: 0.8 K/UL — SIGNIFICANT CHANGE UP (ref 0–0.9)
MONOCYTES NFR BLD AUTO: 9.6 % — HIGH (ref 1–9)
NEUTROPHILS # BLD AUTO: 4.5 K/UL — SIGNIFICANT CHANGE UP (ref 1.8–7.4)
NEUTROPHILS NFR BLD AUTO: 55.8 % — SIGNIFICANT CHANGE UP (ref 43–77)
OB PNL STL: POSITIVE
ORGANISM # SPEC MICROSCOPIC CNT: SIGNIFICANT CHANGE UP
ORGANISM # SPEC MICROSCOPIC CNT: SIGNIFICANT CHANGE UP
PLATELET # BLD AUTO: 184 K/UL — SIGNIFICANT CHANGE UP (ref 150–400)
POTASSIUM SERPL-MCNC: 4.2 MMOL/L — SIGNIFICANT CHANGE UP (ref 3.5–5.3)
POTASSIUM SERPL-SCNC: 4.2 MMOL/L — SIGNIFICANT CHANGE UP (ref 3.5–5.3)
RBC # BLD: 3.36 M/UL — LOW (ref 3.8–5.2)
RBC # FLD: 13.4 % — SIGNIFICANT CHANGE UP (ref 10.3–14.5)
SODIUM SERPL-SCNC: 143 MMOL/L — SIGNIFICANT CHANGE UP (ref 135–145)
SPECIMEN SOURCE: SIGNIFICANT CHANGE UP
WBC # BLD: 8 K/UL — SIGNIFICANT CHANGE UP (ref 3.8–10.5)
WBC # FLD AUTO: 8 K/UL — SIGNIFICANT CHANGE UP (ref 3.8–10.5)

## 2018-03-10 PROCEDURE — 99233 SBSQ HOSP IP/OBS HIGH 50: CPT

## 2018-03-10 RX ADMIN — Medication 81 MILLIGRAM(S): at 11:10

## 2018-03-10 RX ADMIN — Medication 1 TABLET(S): at 11:10

## 2018-03-10 RX ADMIN — EZETIMIBE 10 MILLIGRAM(S): 10 TABLET ORAL at 11:12

## 2018-03-10 RX ADMIN — PANTOPRAZOLE SODIUM 40 MILLIGRAM(S): 20 TABLET, DELAYED RELEASE ORAL at 05:46

## 2018-03-10 RX ADMIN — ATORVASTATIN CALCIUM 10 MILLIGRAM(S): 80 TABLET, FILM COATED ORAL at 21:41

## 2018-03-10 RX ADMIN — Medication 650 MILLIGRAM(S): at 16:11

## 2018-03-10 RX ADMIN — Medication 50 MILLIGRAM(S): at 21:41

## 2018-03-10 RX ADMIN — Medication 25 MICROGRAM(S): at 05:46

## 2018-03-10 RX ADMIN — Medication 10 MILLIGRAM(S): at 11:11

## 2018-03-10 RX ADMIN — Medication 50 MILLIGRAM(S): at 11:11

## 2018-03-10 RX ADMIN — CEFTRIAXONE 100 GRAM(S): 500 INJECTION, POWDER, FOR SOLUTION INTRAMUSCULAR; INTRAVENOUS at 16:31

## 2018-03-10 RX ADMIN — Medication 650 MILLIGRAM(S): at 15:11

## 2018-03-10 RX ADMIN — ENOXAPARIN SODIUM 40 MILLIGRAM(S): 100 INJECTION SUBCUTANEOUS at 11:11

## 2018-03-10 NOTE — PROGRESS NOTE ADULT - ASSESSMENT
Not clear to me that UTI is the issue here.  MS is improved for the moment, in D/W Dr. Nguyen was worse earlier.  Fever could have been related to Fx in/of itself in the absence of infection  WBC normal  Procalcitonin only minimally elevated, though the test is suboptimal.    Antibitoics being managed by primary team  ID will no longer follow.  Discussed with Dr. Nguyen.    Eduar Kevin MD  [phone]
89yo F with PMH of anxiety, depression, diverticulosis, GERD, hypercholesterolemia, hypothyroidism, iron deficiency, raynaud disease, presents with right arm pain s/p fall, admitted for fracture of the right mid humeral shaft, s/p closed reduction and splinting necessitating rehab placement, course c/b fever of unclear etiology.
89yo F with PMH of anxiety, depression, diverticulosis, GERD, hypercholesterolemia, hypothyroidism, iron deficiency, raynaud disease, presents with right arm pain s/p fall, admitted for fracture of the right mid humeral shaft, s/p closed reduction and splinting necessitating rehab placement.
89yo F with PMH of anxiety, depression, diverticulosis, GERD, hypercholesterolemia, hypothyroidism, iron deficiency, raynaud disease, presents with right arm pain s/p fall, admitted for fracture of the right mid humeral shaft, s/p closed reduction and splinting necessitating rehab placement.
A/P: 88y Female with right humeral shaft fracture, closed  Swelling in right hand consistent with dependent edema commonly seen after immobilization for upper extremity fracture,  Dstal aspect of ace wrap was released however this swelling will likely persist due to the dependent nature of the extremity necessary for fracture healing in appropriate alignment  Pain control  NWB RUE in splint, keep c/d/I,   Ice/elevation  Possible need for surgical intervention in future discussed, all questions answered  Follow up with Dr. Alexandra or own orthopedist within 1 week , call office for appointment.  Ortho stable for DC
87yo F with PMH of anxiety, depression, diverticulosis, GERD, hypercholesterolemia, hypothyroidism, iron deficiency, raynaud disease, presents with right arm pain s/p fall, admitted for fracture of the right mid humeral shaft, s/p closed reduction and splinting necessitating rehab placement, course c/b fever likely due to UTI.

## 2018-03-10 NOTE — DISCHARGE NOTE ADULT - HOSPITAL COURSE
87yo female with pmhx of anxiety, depression, diverticulosis, GERD, hypercholesterolemia, hypothyroidism iron deficiency, raynaud disease, presents with right arm pain s/p fall this morning. Patient states she consuelo to get out of bed, reached for her walker, and fell onto the right side of her body. Patient denies hit ter her head or LOC. She felt immediate constant right arm pain and was BIBEMS to [facility] ED for further medical evaluation. Xray showed fracture of the right mid humeral shaft with overlapping fracture fragments and varus angulation at the fracture apex. Orthopedic surgery consult evaluated the patient, performed a closed reduction and placed right arm in padded coaptation splint. Patient was evaluated by Physical Therapy, who determined patient must go to rehabilitation facility prior to returning home. Pt had significant edema of right arm distal to the coaptation splint that orthopedics stated was normal for that type of splint given how tight it is made to prevent the humerus from  again. Suggested elevation of the arm and hand squeezing exercises. No sign of compartment syndrome. Pt also had a fever and confusion, likely due to UTI. ID (Jaylen) on the case. Started on rocephin. 89yo female with pmhx of anxiety, depression, diverticulosis, GERD, hypercholesterolemia, hypothyroidism iron deficiency, raynaud disease, presents with right arm pain s/p fall this morning. Patient states she consuelo to get out of bed, reached for her walker, and fell onto the right side of her body. Patient denies hitting her head or LOC. She felt immediate constant right arm pain and was BIBEMS to [facility] ED for further medical evaluation. Xray showed fracture of the right mid humeral shaft with overlapping fracture fragments and varus angulation at the fracture apex. Orthopedic surgery consult evaluated the patient, performed a closed reduction and placed right arm in padded coaptation splint. Patient was evaluated by Physical Therapy, who determined patient must go to rehabilitation facility prior to returning home. Pt had significant edema of right arm distal to the coaptation splint that orthopedics stated was normal for that type of splint given how tight it is made to prevent the humerus from  again. Suggested elevation of the arm and hand squeezing exercises. No sign of compartment syndrome. Pt also had a fever and confusion, likely due to UTI. ID (Jaylen) on the case. Started on rocephin. Urine cx grew out pan sensitive E. coli and patient's mental status improved with antibiotic treatment.  Pt. also noted to have anemia and stool OB was positive.  She had GI consult who has recommended monitoring of hemoglobin for now.  She was also noted to have chronic elevation of her alk phos and will follow up with GI as outpatient. 87yo female with pmhx of anxiety, depression, diverticulosis, GERD, hypercholesterolemia, hypothyroidism iron deficiency, raynaud disease, presents with right arm pain s/p fall this morning. Patient states she consuelo to get out of bed, reached for her walker, and fell onto the right side of her body. Patient denies hitting her head or LOC. She felt immediate constant right arm pain and was BIBEMS to [facility] ED for further medical evaluation. Xray showed fracture of the right mid humeral shaft with overlapping fracture fragments and varus angulation at the fracture apex. Orthopedic surgery consult evaluated the patient, performed a closed reduction and placed right arm in padded coaptation splint. Patient was evaluated by Physical Therapy, who determined patient must go to rehabilitation facility prior to returning home. Pt had significant edema of right arm distal to the coaptation splint that orthopedics stated was normal for that type of splint given how tight it is made to prevent the humerus from  again. Suggested elevation of the arm and hand squeezing exercises. No sign of compartment syndrome. Pt also had a fever and confusion, likely due to UTI. ID (Jaylen) on the case. Started on rocephin. Urine cx grew out pan sensitive E. coli and patient's mental status improved with antibiotic treatment.  Pt. also noted to have anemia and stool OB was positive.  She had GI consult who has recommended monitoring of hemoglobin for now.  She was also noted to have chronic elevation of her alk phos and will follow up with GI as outpatient.      On day of discharge patient is in no distress.  Afebrile and hemodynamically stable.      Completion of discharge in 34 minutes.

## 2018-03-10 NOTE — PROGRESS NOTE ADULT - SUBJECTIVE AND OBJECTIVE BOX
[facility], Division of Infectious Diseases  [place] Elmore A. Lee    Name: DONNY MANNING  Age: 88y  Gender: Female  MRN: [id number]    Interval History--  Notes reviewed. Antibiotics resumed by primary team.  Patient easily arousable, knows she's in  hospital, oriented to year and month, knows that she fell.  No complaints presently. Denies any urinary symptoms, for whatever it may be worth.    Past Medical History--  Diverticulosis  Raynaud disease  Iron deficiency anemia  Anxiety  Diverticulitis  Hypercholesteremia  Hypothyroid  GERD (gastroesophageal reflux disease)  Depression  Furuncle of ankle  S/P tonsillectomy  History of cataract surgery  H/O total hysterectomy  History of cholecystectomy  History of bilateral knee replacement  No significant past surgical history      For details regarding the patient's social history, family history, and other miscellaneous elements, please refer the initial infectious diseases consultation and/or the admitting history and physical examination for this admission.    Allergies    No Known Allergies    Intolerances        Medications--  Antibiotics:  cefTRIAXone   IVPB      cefTRIAXone   IVPB 1 Gram(s) IV Intermittent every 24 hours    Immunologic:    Other:  acetaminophen   Tablet PRN  acetaminophen   Tablet. PRN  aspirin enteric coated  atorvastatin  doxepin  enoxaparin Injectable  ezetimibe  levothyroxine  multivitamin  pantoprazole    Tablet  PARoxetine  traZODone      Review of Systems--  A 10-point review of systems was obtained.   Review of systems otherwise negative except as previously noted.    Physical Examination--  Vital Signs: T(F): 97.9 (03-10-18 @ 04:33), Max: 98.9 (03-09-18 @ 20:59)  HR: 77 (03-10-18 @ 04:33)  BP: 122/77 (03-10-18 @ 04:33)  RR: 17 (03-10-18 @ 04:33)  SpO2: 93% (03-10-18 @ 04:33)  Wt(kg): --  General: Nontoxic-appearing Female in no acute distress.  HEENT: AT/NC.  Anicteric. Conjunctiva pink and moist. Oropharynx clear. Dentition poor.  Neck: Not rigid. No sense of mass.  Nodes: None palpable.  Lungs: Clear bilaterally without rales, wheezing or rhonchi  Heart: Regular rate and rhythm. No Murmur. No rub. No gallop. No palpable thrill.  Abdomen: Bowel sounds present and normoactive. Soft. Nondistended. Nontender. No sense of mass. No organomegaly.  Back: No spinal tenderness. No costovertebral angle tenderness.   Extremities: No cyanosis or clubbing. 1+edema. Venous insufficiency.  Skin: Warm. Dry. Good turgor. No rash. No vasculitic stigmata.  Psychiatric: Appropriate affect and mood for situation.         Laboratory Studies--  CBC                        10.4   8.0   )-----------( 184      ( 10 Mar 2018 05:57 )             31.2       Chemistries  03-10    143  |  108  |  26<H>  ----------------------------<  92  4.2   |  30  |  0.77    Ca    9.1      10 Mar 2018 05:57    TPro  6.3  /  Alb  2.4<L>  /  TBili  0.7  /  DBili  x   /  AST  54<H>  /  ALT  67  /  AlkPhos  387<H>  03-09      Culture Data    Culture - Blood (collected 08 Mar 2018 23:35)  Source: .Blood Blood-Peripheral  Preliminary Report (10 Mar 2018 01:03):    No growth to date.    Culture - Blood (collected 08 Mar 2018 23:35)  Source: .Blood Blood  Preliminary Report (10 Mar 2018 01:03):    No growth to date.    Culture - Urine (collected 08 Mar 2018 22:56)  Source: .Urine Catheterized  Preliminary Report (09 Mar 2018 16:12):    >100,000 CFU/ml Escherichia coli

## 2018-03-10 NOTE — DISCHARGE NOTE ADULT - MEDICATION SUMMARY - MEDICATIONS TO TAKE
I will START or STAY ON the medications listed below when I get home from the hospital:    aspirin 81 mg oral delayed release tablet  -- 1 tab(s) by mouth once a day with meals  -- Indication: For Cardiac prevention    acetaminophen 325 mg oral tablet  -- 2 tab(s) by mouth every 6 hours, As needed, Moderate Pain (4 - 6)  -- Indication: For Pain control    traZODone 50 mg oral tablet  -- 1 tab(s) by mouth once a day (at bedtime)  -- Indication: For Depression    PARoxetine 20 mg oral tablet  -- 2.5 tab(s) by mouth once a day  -- Indication: For Depression    atorvastatin 10 mg oral tablet  -- 1 tab(s) by mouth once a day  -- Indication: For Hypercholesteremia    ezetimibe 10 mg oral tablet  -- 1 tab(s) by mouth once a day  -- Indication: For Hypercholesteremia    doxepin 10 mg oral capsule  -- 1 tab(s) by mouth once a day  -- Indication: For Anxiolytic    Iron 100 Plus oral tablet  -- 1 cap(s) by mouth every other day  -- Indication: For Anemia    omeprazole 40 mg oral delayed release capsule  -- 1 cap(s) by mouth once a day  -- Indication: For GERD (gastroesophageal reflux disease)    ciprofloxacin 500 mg oral tablet  -- 1 tab(s) by mouth every 12 hours x 4 days  -- Indication: For UTI (urinary tract infection)    Synthroid 25 mcg (0.025 mg) oral tablet  -- 1 tab(s) by mouth once a day  -- Indication: For Hypothyroid    Multiple Vitamins oral tablet  -- 1 tab(s) by mouth once a day  -- Indication: For supplement

## 2018-03-10 NOTE — PROGRESS NOTE ADULT - SUBJECTIVE AND OBJECTIVE BOX
CHIEF COMPLAINT/INTERVAL HISTORY:  Pt. seen and evaluated for R. humerus fracture.  Pt. resting in bed in no distress.  No events over the night.  Easily arousable.  Reports no gross bleeding and denies having any pain other than with fracture.      REVIEW OF SYSTEMS:  No fever, CP, or SOB.    Vital Signs Last 24 Hrs  T(C): 36.6 (10 Mar 2018 04:33), Max: 37.2 (09 Mar 2018 20:59)  T(F): 97.9 (10 Mar 2018 04:33), Max: 98.9 (09 Mar 2018 20:59)  HR: 77 (10 Mar 2018 04:33) (77 - 92)  BP: 122/77 (10 Mar 2018 04:33) (121/67 - 124/69)  BP(mean): --  RR: 17 (10 Mar 2018 04:33) (17 - 19)  SpO2: 93% (10 Mar 2018 04:33) (93% - 97%)    PHYSICAL EXAM:  GENERAL: NAD  HEENT: EOMI, hearing normal, conjunctiva and sclera clear  Chest: CTA bilaterally, no wheezing  CV: S1S2, RRR,   GI: soft, +BS, NT/ND  Musculoskeletal: RUE in splint, edema of R. hand   Psychiatric: affect nL, mood nL  Skin: warm and dry    LABS:                        10.4   8.0   )-----------( 184      ( 10 Mar 2018 05:57 )             31.2     03-10    143  |  108  |  26<H>  ----------------------------<  92  4.2   |  30  |  0.77    Ca    9.1      10 Mar 2018 05:57    TPro  6.3  /  Alb  2.4<L>  /  TBili  0.7  /  DBili  x   /  AST  54<H>  /  ALT  67  /  AlkPhos  387<H>  03-09      Urinalysis Basic - ( 08 Mar 2018 19:19 )    Color: Yellow / Appearance: Slightly Turbid / S.020 / pH: x  Gluc: x / Ketone: Negative  / Bili: Small / Urobili: Negative   Blood: x / Protein: 25 mg/dL / Nitrite: Negative   Leuk Esterase: Moderate / RBC: 3-5 /HPF / WBC 26-50   Sq Epi: x / Non Sq Epi: Occasional / Bacteria: Many        Assessment and Plan:  -s/p fall with R. humerus fracture:  s/p closed reduction and splinting.  Dr. Mitchell (ortho) states this level of swelling is common with the very tight coaptation splints due to edema. Arm elevation, squeezing hand exercises recommended by ortho. No sign of compartment syndrome. Edema with some improvement today -- again encouraged pt to elevate the arm.  Tylenol PRN.  - Fever and UTI:  +E. coli in urine cx.  Started on Ceftriaxone 1gm IV daily.  ID f/u  -Elevated Alk Phos:  No biliary dilatation on US.  Noted that alk phos has been elevated in the past to high 300s in 2017; reviewed with Dr. Jara who feels he can follow / work-up as outpt -- will give pt Dr. Jara's contact information for appt on discharge.  -Anemia:  No gross bleeding and denies having any pain other than from fracture.  Will repeat Hbg @ 2PM.  Check iron studies, folate, b12, and stool OB.    -Hypothyroidism:  continue Synthroid 25mcg PO daily  -HLD: continue lipitor and zetia.    -VTE ppx:  Lovenox 40mg SQ daily

## 2018-03-10 NOTE — DISCHARGE NOTE ADULT - PLAN OF CARE
Maintain splint immobilization Follow up with PMD and orthopedic Dr. Alexandra in 1 week, nonweight bearing of right upper extremity, low cholesterol diet Follow up with Dr. Jara in 2 weeks continue iron supplement continue synthroid complete course of antibiotics Follow up with GI Dr. Jara Follow up with PMD and orthopedic Dr. Alexandra in 1 week, nonweight bearing of right upper extremity, low cholesterol diet.  Dry sterile dressing over opened blister daily.

## 2018-03-10 NOTE — DISCHARGE NOTE ADULT - CARE PLAN
Principal Discharge DX:	Fracture closed, humerus  Goal:	Maintain splint immobilization  Assessment and plan of treatment:	Follow up with PMD and orthopedic Dr. Alexandra in 1 week, nonweight bearing of right upper extremity, low cholesterol diet  Secondary Diagnosis:	Alkaline phosphatase elevation  Assessment and plan of treatment:	Follow up with Dr. Jara in 2 weeks  Secondary Diagnosis:	Iron deficiency anemia  Goal:	continue iron supplement  Secondary Diagnosis:	Hypothyroid  Goal:	continue synthroid  Secondary Diagnosis:	UTI (urinary tract infection)  Goal:	complete course of antibiotics Principal Discharge DX:	Fracture closed, humerus  Goal:	Maintain splint immobilization  Assessment and plan of treatment:	Follow up with PMD and orthopedic Dr. Alexandra in 1 week, nonweight bearing of right upper extremity, low cholesterol diet  Secondary Diagnosis:	Alkaline phosphatase elevation  Assessment and plan of treatment:	Follow up with Dr. Jara in 2 weeks  Secondary Diagnosis:	Iron deficiency anemia  Goal:	continue iron supplement  Assessment and plan of treatment:	Follow up with GI Dr. Jara  Secondary Diagnosis:	Hypothyroid  Goal:	continue synthroid  Secondary Diagnosis:	UTI (urinary tract infection)  Goal:	complete course of antibiotics Principal Discharge DX:	Fracture closed, humerus  Goal:	Maintain splint immobilization  Assessment and plan of treatment:	Follow up with PMD and orthopedic Dr. Alexandra in 1 week, nonweight bearing of right upper extremity, low cholesterol diet.  Dry sterile dressing over opened blister daily.  Secondary Diagnosis:	Alkaline phosphatase elevation  Assessment and plan of treatment:	Follow up with Dr. Jara in 2 weeks  Secondary Diagnosis:	Iron deficiency anemia  Goal:	continue iron supplement  Assessment and plan of treatment:	Follow up with GI Dr. Jara  Secondary Diagnosis:	Hypothyroid  Goal:	continue synthroid  Secondary Diagnosis:	UTI (urinary tract infection)  Goal:	complete course of antibiotics

## 2018-03-10 NOTE — CHART NOTE - NSCHARTNOTEFT_GEN_A_CORE
Called to evaluate swelling and blister on patient's RUE    Swelling in right hand consistent with previously visualized edema which is not warm or erythematous, likely secondary to dependent positioning which is necessary for fracture healing in this situation  Small 2x1cm serous blister noted on the volar aspect of the forearm just distal to the Antecubital area likely a fracture blister. No intervention at this time for blister, if it ruptures, keep area clean and dry with a dry dressing.   Discussed with family the etiology of the swelling and blister and answered all questions. They understand the plan for continued immobilization of the RUE for fracture healing.  Recommended continued motion of the right hand to assist with swelling reduction. Explained the possible need for future surgical intervention.  Advised for follow-up outpatient with plan for application of a ortega fracture brace after 2 weeks of splint immobilization.   No acute orthopedic surgical intervention at this time  Ortho stable

## 2018-03-10 NOTE — DISCHARGE NOTE ADULT - PATIENT PORTAL LINK FT
You can access the [facility], offered by [facility], by registering with the following website: [email or web address]

## 2018-03-10 NOTE — DISCHARGE NOTE ADULT - CARE PROVIDER_API CALL
Jimmy Gong), Orthopaedic Surgery Surgery  [address] [place] [address]  Phone: [phone]  Fax: [phone]    Vlad Jara (), Gastroenterology; Internal Medicine  [address] [place] [address]  Phone: [phone]  Fax: [phone]    Ender Hill), Internal Medicine  [address] [place] [id number]  Phone: [phone]  Fax: [phone]

## 2018-03-11 DIAGNOSIS — D50.8 OTHER IRON DEFICIENCY ANEMIAS: ICD-10-CM

## 2018-03-11 LAB
ANION GAP SERPL CALC-SCNC: 6 MMOL/L — SIGNIFICANT CHANGE UP (ref 5–17)
BUN SERPL-MCNC: 30 MG/DL — HIGH (ref 7–23)
CALCIUM SERPL-MCNC: 9.1 MG/DL — SIGNIFICANT CHANGE UP (ref 8.5–10.1)
CHLORIDE SERPL-SCNC: 105 MMOL/L — SIGNIFICANT CHANGE UP (ref 96–108)
CO2 SERPL-SCNC: 29 MMOL/L — SIGNIFICANT CHANGE UP (ref 22–31)
CREAT SERPL-MCNC: 0.69 MG/DL — SIGNIFICANT CHANGE UP (ref 0.5–1.3)
FERRITIN SERPL-MCNC: 76 NG/ML — SIGNIFICANT CHANGE UP (ref 15–150)
FOLATE SERPL-MCNC: >20 NG/ML — SIGNIFICANT CHANGE UP (ref 4.8–24.2)
GLUCOSE SERPL-MCNC: 131 MG/DL — HIGH (ref 70–99)
HCT VFR BLD CALC: 34.3 % — LOW (ref 34.5–45)
HGB BLD-MCNC: 11.1 G/DL — LOW (ref 11.5–15.5)
IRON SATN MFR SERPL: 18 % — SIGNIFICANT CHANGE UP (ref 14–50)
IRON SATN MFR SERPL: 40 UG/DL — SIGNIFICANT CHANGE UP (ref 30–160)
MAGNESIUM SERPL-MCNC: 2 MG/DL — SIGNIFICANT CHANGE UP (ref 1.6–2.6)
MCHC RBC-ENTMCNC: 30.3 PG — SIGNIFICANT CHANGE UP (ref 27–34)
MCHC RBC-ENTMCNC: 32.4 GM/DL — SIGNIFICANT CHANGE UP (ref 32–36)
MCV RBC AUTO: 93.5 FL — SIGNIFICANT CHANGE UP (ref 80–100)
PLATELET # BLD AUTO: 227 K/UL — SIGNIFICANT CHANGE UP (ref 150–400)
POTASSIUM SERPL-MCNC: 4.1 MMOL/L — SIGNIFICANT CHANGE UP (ref 3.5–5.3)
POTASSIUM SERPL-SCNC: 4.1 MMOL/L — SIGNIFICANT CHANGE UP (ref 3.5–5.3)
RBC # BLD: 3.67 M/UL — LOW (ref 3.8–5.2)
RBC # FLD: 12.9 % — SIGNIFICANT CHANGE UP (ref 10.3–14.5)
SODIUM SERPL-SCNC: 140 MMOL/L — SIGNIFICANT CHANGE UP (ref 135–145)
TIBC SERPL-MCNC: 226 UG/DL — SIGNIFICANT CHANGE UP (ref 220–430)
UIBC SERPL-MCNC: 186 UG/DL — SIGNIFICANT CHANGE UP (ref 110–370)
VIT B12 SERPL-MCNC: 732 PG/ML — SIGNIFICANT CHANGE UP (ref 232–1245)
WBC # BLD: 7.5 K/UL — SIGNIFICANT CHANGE UP (ref 3.8–10.5)
WBC # FLD AUTO: 7.5 K/UL — SIGNIFICANT CHANGE UP (ref 3.8–10.5)

## 2018-03-11 PROCEDURE — 99233 SBSQ HOSP IP/OBS HIGH 50: CPT

## 2018-03-11 RX ORDER — CIPROFLOXACIN LACTATE 400MG/40ML
500 VIAL (ML) INTRAVENOUS EVERY 12 HOURS
Qty: 0 | Refills: 0 | Status: DISCONTINUED | OUTPATIENT
Start: 2018-03-11 | End: 2018-03-12

## 2018-03-11 RX ADMIN — Medication 500 MILLIGRAM(S): at 17:07

## 2018-03-11 RX ADMIN — ENOXAPARIN SODIUM 40 MILLIGRAM(S): 100 INJECTION SUBCUTANEOUS at 11:21

## 2018-03-11 RX ADMIN — PANTOPRAZOLE SODIUM 40 MILLIGRAM(S): 20 TABLET, DELAYED RELEASE ORAL at 05:58

## 2018-03-11 RX ADMIN — Medication 81 MILLIGRAM(S): at 11:22

## 2018-03-11 RX ADMIN — Medication 50 MILLIGRAM(S): at 11:23

## 2018-03-11 RX ADMIN — Medication 25 MICROGRAM(S): at 05:58

## 2018-03-11 RX ADMIN — Medication 1 TABLET(S): at 11:22

## 2018-03-11 RX ADMIN — ATORVASTATIN CALCIUM 10 MILLIGRAM(S): 80 TABLET, FILM COATED ORAL at 21:44

## 2018-03-11 RX ADMIN — Medication 10 MILLIGRAM(S): at 11:22

## 2018-03-11 RX ADMIN — Medication 50 MILLIGRAM(S): at 21:44

## 2018-03-11 NOTE — CONSULT NOTE ADULT - PROBLEM SELECTOR RECOMMENDATION 9
daily cbc   transfuse prn   consider hematology eval  check iron studies   ppi once a day  check stool occult blood  further recommendations pending above
No obv localization and at this point not clear if this represents an acute infectious issue versus fever and inflammatory response due to right arm fracture. Procalcitonin could also be due to fracture. Highest concern for urinary infection and this could have been the precipitant for the fall. Recommend: follow results of urine and blood micro. Observe off abx for now but further recs to follow.

## 2018-03-11 NOTE — CONSULT NOTE ADULT - SUBJECTIVE AND OBJECTIVE BOX
Chief Complaint:  Patient is a 88y old  Female who presents with a chief complaint of fall 89yo female with pmhx of anxiety, depression, diverticulosis, GERD, hypercholesterolemia, hypothyroidism iron deficiency, raynaud disease, presents with right arm pain s/p fall this morning. Patient states she consuelo to get out of bed, reached for her walker, and fell onto the right side of her body. Patient denies hit ter her head or LOC. She felt immediate constant right arm pain and was [facility]IB[facility]S to [facility] for further medical evaluation. Xray exhibited fracture of the right mid humeral shaft with overlapping fracture fragments and varus angulation at the fracture apex. Orthopedic surgery consult evaluated the patient, performed a closed reduction and placed right arm in padded coaptation splint. Patient was evaluated by Physical Therapy, who determined patient must go to rehabilitation facility prior to returning home.     Allergies:  No Known Allergies    Medications:  acetaminophen   Tablet 650 milliGRAM(s) Oral every 6 hours PRN  acetaminophen   Tablet. 650 milliGRAM(s) Oral every 6 hours PRN  aluminum hydroxide/magnesium hydroxide/simethicone Suspension 30 milliLiter(s) Oral every 4 hours PRN  aspirin enteric coated 81 milliGRAM(s) Oral daily  atorvastatin 10 milliGRAM(s) Oral at bedtime  ciprofloxacin     Tablet 500 milliGRAM(s) Oral every 12 hours  doxepin 10 milliGRAM(s) Oral daily  enoxaparin Injectable 40 milliGRAM(s) SubCutaneous daily  ezetimibe 10 milliGRAM(s) Oral daily  levothyroxine 25 MICROGram(s) Oral daily  multivitamin 1 Tablet(s) Oral daily  pantoprazole    Tablet 40 milliGRAM(s) Oral before breakfast  PARoxetine 50 milliGRAM(s) Oral daily  traZODone 50 milliGRAM(s) Oral at bedtime    PMHX/PSHX:  Diverticulosis  Raynaud disease  Iron deficiency anemia  Anxiety  Diverticulitis  Hypercholesteremia  Hypothyroid  GERD (gastroesophageal reflux disease)  Depression  Furuncle of ankle  S/P tonsillectomy  History of cataract surgery  H/O total hysterectomy  History of cholecystectomy  History of bilateral knee replacement  No significant past surgical history      Family history:  No pertinent family history in first degree relatives      Social History:     ROS:     General:  No wt loss, fevers, chills, night sweats, fatigue,   Eyes:  Good vision, no reported pain  ENT:  No sore throat, pain, runny nose, dysphagia  CV:  No pain, palpitations, hypo/hypertension  Resp:  No dyspnea, cough, tachypnea, wheezing  GI:  No pain, No nausea, No vomiting, No diarrhea, No constipation, No weight loss, No fever, No pruritis, No rectal bleeding, No tarry stools, No dysphagia,  :  No pain, bleeding, incontinence, nocturia  Muscle:  No pain, weakness  Neuro:  No weakness, tingling, memory problems  Psych:  No fatigue, insomnia, mood problems, depression  Endocrine:  No polyuria, polydipsia, cold/heat intolerance  Heme:  No petechiae, ecchymosis, easy bruisability  Skin:  No rash, tattoos, scars, edema      PHYSICAL EXAM:   Vital Signs:  Vital Signs Last 24 Hrs  T(C): 37.1 (11 Mar 2018 13:54), Max: 37.2 (10 Mar 2018 21:47)  T(F): 98.8 (11 Mar 2018 13:54), Max: 98.9 (10 Mar 2018 21:47)  HR: 92 (11 Mar 2018 13:54) (83 - 92)  BP: 119/70 (11 Mar 2018 13:54) (119/70 - 139/68)  BP(mean): --  RR: 15 (11 Mar 2018 13:54) (15 - 18)  SpO2: 96% (11 Mar 2018 13:54) (95% - 96%)  Daily     Daily Weight in k.3 (11 Mar 2018 04:10)    GENERAL:  Appears stated age, well-groomed, well-nourished, no distress  HEENT:  NC/AT,  conjunctivae clear and pink, no thyromegaly, nodules, adenopathy, no JVD, sclera -anicteric  CHEST:  Full & symmetric excursion, no increased effort, breath sounds clear  HEART:  Regular rhythm, S1, S2, no murmur/rub/S3/S4, no abdominal bruit, no edema  ABDOMEN:  Soft, non-tender, non-distended, normoactive bowel sounds,  no masses ,no hepato-splenomegaly, no signs of chronic liver disease  EXTEREMITIES:  no cyanosis,clubbing or edema  SKIN:  No rash/erythema/ecchymoses/petechiae/wounds/abscess/warm/dry  NEURO:  Alert, oriented, no asterixis, no tremor, no encephalopathy    LABS:                        11.1   7.5   )-----------( 227      ( 11 Mar 2018 09:26 )             34.3     03-11    140  |  105  |  30<H>  ----------------------------<  131<H>  4.1   |  29  |  0.69    Ca    9.1      11 Mar 2018 09:26  Mg     2.0     03-11                Imaging:

## 2018-03-11 NOTE — PROGRESS NOTE ADULT - SUBJECTIVE AND OBJECTIVE BOX
CHIEF COMPLAINT/INTERVAL HISTORY:  Pt. seen and evaluated for R. humerus fracture.  Pt. is in no distress.  Currently eating breakfast.  Reported that blister of the R. arm had opened.      REVIEW OF SYSTEMS:  No fever, CP, or SOB.    Vital Signs Last 24 Hrs  T(C): 36.8 (11 Mar 2018 04:10), Max: 37.2 (10 Mar 2018 21:47)  T(F): 98.3 (11 Mar 2018 04:10), Max: 98.9 (10 Mar 2018 21:47)  HR: 83 (11 Mar 2018 04:10) (83 - 92)  BP: 139/68 (11 Mar 2018 04:10) (116/68 - 139/68)  BP(mean): --  RR: 17 (11 Mar 2018 04:10) (15 - 18)  SpO2: 96% (11 Mar 2018 04:10) (95% - 97%)    PHYSICAL EXAM:  GENERAL: NAD  HEENT: EOMI, hearing normal, conjunctiva and sclera clear  Chest: CTA bilaterally, no wheezing  CV: S1S2, RRR,   GI: soft, +BS, NT/ND  Musculoskeletal: RUE in splint with edema of the R. hand and forearm (unchanged from yesterday)  Psychiatric: affect nL, mood nL  Skin: warm and dry    LABS:                        12.3   x     )-----------( x        ( 10 Mar 2018 14:30 )             37.3     03-10    143  |  108  |  26<H>  ----------------------------<  92  4.2   |  30  |  0.77    Ca    9.1      10 Mar 2018 05:57      Assessment and Plan:  -s/p fall with R. humerus fracture:  s/p closed reduction and splinting.  Dr. Mitchell (ortho) states this level of swelling is common with the very tight coaptation splints due to edema. Arm elevation, squeezing hand exercises recommended by ortho. No sign of compartment syndrome.  Tylenol PRN.  -UTI:  +E. coli in urine cx.  Switch to Cipro 500mg PO Q12h  -Elevated Alk Phos:  No biliary dilatation on US.  Noted that alk phos has been elevated in the past to high 300s in 11/2017; reviewed with Dr. Jara who feels he can follow / work-up as outpt -- will give pt Dr. Jara's contact information for appt on discharge.  -Anemia:  No gross bleeding and denies having any pain other than from fracture.  Stool OB positive.  Check iron studies, folate, and b12.  -Hypothyroidism:  continue Synthroid 25mcg PO daily  -HLD: continue lipitor and zetia.    -VTE ppx:  Lovenox 40mg SQ daily

## 2018-03-12 VITALS
TEMPERATURE: 98 F | HEART RATE: 80 BPM | RESPIRATION RATE: 17 BRPM | DIASTOLIC BLOOD PRESSURE: 63 MMHG | SYSTOLIC BLOOD PRESSURE: 113 MMHG | OXYGEN SATURATION: 94 %

## 2018-03-12 DIAGNOSIS — N39.0 URINARY TRACT INFECTION, SITE NOT SPECIFIED: ICD-10-CM

## 2018-03-12 DIAGNOSIS — Z71.89 OTHER SPECIFIED COUNSELING: ICD-10-CM

## 2018-03-12 DIAGNOSIS — D64.9 ANEMIA, UNSPECIFIED: ICD-10-CM

## 2018-03-12 PROCEDURE — 84100 ASSAY OF PHOSPHORUS: CPT

## 2018-03-12 PROCEDURE — 80048 BASIC METABOLIC PNL TOTAL CA: CPT

## 2018-03-12 PROCEDURE — 87086 URINE CULTURE/COLONY COUNT: CPT

## 2018-03-12 PROCEDURE — 85730 THROMBOPLASTIN TIME PARTIAL: CPT

## 2018-03-12 PROCEDURE — 81001 URINALYSIS AUTO W/SCOPE: CPT

## 2018-03-12 PROCEDURE — 85610 PROTHROMBIN TIME: CPT

## 2018-03-12 PROCEDURE — 82272 OCCULT BLD FECES 1-3 TESTS: CPT

## 2018-03-12 PROCEDURE — 97162 PT EVAL MOD COMPLEX 30 MIN: CPT

## 2018-03-12 PROCEDURE — 96375 TX/PRO/DX INJ NEW DRUG ADDON: CPT

## 2018-03-12 PROCEDURE — 85018 HEMOGLOBIN: CPT

## 2018-03-12 PROCEDURE — 82607 VITAMIN B-12: CPT

## 2018-03-12 PROCEDURE — 82728 ASSAY OF FERRITIN: CPT

## 2018-03-12 PROCEDURE — 99239 HOSP IP/OBS DSCHRG MGMT >30: CPT

## 2018-03-12 PROCEDURE — 93005 ELECTROCARDIOGRAM TRACING: CPT

## 2018-03-12 PROCEDURE — 87040 BLOOD CULTURE FOR BACTERIA: CPT

## 2018-03-12 PROCEDURE — 76705 ECHO EXAM OF ABDOMEN: CPT

## 2018-03-12 PROCEDURE — 73060 X-RAY EXAM OF HUMERUS: CPT

## 2018-03-12 PROCEDURE — 83735 ASSAY OF MAGNESIUM: CPT

## 2018-03-12 PROCEDURE — 84145 PROCALCITONIN (PCT): CPT

## 2018-03-12 PROCEDURE — 85027 COMPLETE CBC AUTOMATED: CPT

## 2018-03-12 PROCEDURE — 96374 THER/PROPH/DIAG INJ IV PUSH: CPT

## 2018-03-12 PROCEDURE — 99285 EMERGENCY DEPT VISIT HI MDM: CPT | Mod: 25

## 2018-03-12 PROCEDURE — 87186 SC STD MICRODIL/AGAR DIL: CPT

## 2018-03-12 PROCEDURE — 97110 THERAPEUTIC EXERCISES: CPT

## 2018-03-12 PROCEDURE — 83550 IRON BINDING TEST: CPT

## 2018-03-12 PROCEDURE — 97530 THERAPEUTIC ACTIVITIES: CPT

## 2018-03-12 PROCEDURE — 80053 COMPREHEN METABOLIC PANEL: CPT

## 2018-03-12 PROCEDURE — 97116 GAIT TRAINING THERAPY: CPT

## 2018-03-12 PROCEDURE — 82746 ASSAY OF FOLIC ACID SERUM: CPT

## 2018-03-12 PROCEDURE — 71045 X-RAY EXAM CHEST 1 VIEW: CPT

## 2018-03-12 RX ORDER — CIPROFLOXACIN LACTATE 400MG/40ML
1 VIAL (ML) INTRAVENOUS
Qty: 0 | Refills: 0 | COMMUNITY
Start: 2018-03-12

## 2018-03-12 RX ORDER — ACETAMINOPHEN 500 MG
2 TABLET ORAL
Qty: 0 | Refills: 0 | COMMUNITY
Start: 2018-03-12

## 2018-03-12 RX ADMIN — Medication 50 MILLIGRAM(S): at 12:47

## 2018-03-12 RX ADMIN — Medication 81 MILLIGRAM(S): at 12:48

## 2018-03-12 RX ADMIN — Medication 1 TABLET(S): at 12:47

## 2018-03-12 RX ADMIN — Medication 25 MICROGRAM(S): at 05:49

## 2018-03-12 RX ADMIN — Medication 10 MILLIGRAM(S): at 12:47

## 2018-03-12 RX ADMIN — PANTOPRAZOLE SODIUM 40 MILLIGRAM(S): 20 TABLET, DELAYED RELEASE ORAL at 05:50

## 2018-03-12 RX ADMIN — Medication 500 MILLIGRAM(S): at 05:49

## 2018-03-12 NOTE — PROGRESS NOTE ADULT - SUBJECTIVE AND OBJECTIVE BOX
Interval Events: No new overnight event.  No N/V/D, melena/BRBPR. Tolerating diet.    HPI:Chief Complaint:  Patient is a 88y old  Female who presents with a chief complaint of fall 87yo female with pmhx of anxiety, depression, diverticulosis, GERD, hypercholesterolemia, hypothyroidism iron deficiency, raynaud disease, presents with right arm pain s/p fall this morning. Patient states she consuelo to get out of bed, reached for her walker, and fell onto the right side of her body. Patient denies hit ter her head or LOC. She felt immediate constant right arm pain and was BIBEMS to [facility] ED for further medical evaluation. Xray exhibited fracture of the right mid humeral shaft with overlapping fracture fragments and varus angulation at the fracture apex. Orthopedic surgery consult evaluated the patient, performed a closed reduction and placed right arm in padded coaptation splint. Patient was evaluated by Physical Therapy, who determined patient must go to rehabilitation facility prior to returning home.    MEDICATIONS  (STANDING):  aspirin enteric coated 81 milliGRAM(s) Oral daily  atorvastatin 10 milliGRAM(s) Oral at bedtime  ciprofloxacin     Tablet 500 milliGRAM(s) Oral every 12 hours  doxepin 10 milliGRAM(s) Oral daily  enoxaparin Injectable 40 milliGRAM(s) SubCutaneous daily  ezetimibe 10 milliGRAM(s) Oral daily  levothyroxine 25 MICROGram(s) Oral daily  multivitamin 1 Tablet(s) Oral daily  pantoprazole    Tablet 40 milliGRAM(s) Oral before breakfast  PARoxetine 50 milliGRAM(s) Oral daily  traZODone 50 milliGRAM(s) Oral at bedtime    MEDICATIONS  (PRN):  acetaminophen   Tablet 650 milliGRAM(s) Oral every 6 hours PRN For Temp greater than 38 C (100.4 F)  acetaminophen   Tablet. 650 milliGRAM(s) Oral every 6 hours PRN Moderate Pain (4 - 6)  aluminum hydroxide/magnesium hydroxide/simethicone Suspension 30 milliLiter(s) Oral every 4 hours PRN Dyspepsia      Allergies    No Known Allergies    Intolerances        Review of Systems:    General:  No wt loss, fevers, chills, night sweats,fatigue,   Eyes:  Good vision, no reported pain  ENT:  No sore throat, pain, runny nose, dysphagia  CV:  No pain, palpitations, hypo/hypertension  Resp:  No dyspnea, cough, tachypnea, wheezing  GI:  No pain, No nausea, No vomiting, No diarrhea, No constipation, No weight loss, No fever, No pruritis, No rectal bleeding, No melena, No dysphagia  :  No pain, bleeding, incontinence, nocturia  Muscle:  No pain, weakness  Neuro:  No weakness, tingling, memory problems  Psych:  No fatigue, insomnia, mood problems, depression  Endocrine:  No polyuria, polydypsia, cold/heat intolerance  Heme:  No petechiae, ecchymosis, easy bruisability  Skin:  No rash, tattoos, scars, edema      Vital Signs Last 24 Hrs  T(C): 36.8 (12 Mar 2018 04:15), Max: 37.1 (11 Mar 2018 13:54)  T(F): 98.2 (12 Mar 2018 04:15), Max: 98.8 (11 Mar 2018 13:54)  HR: 80 (12 Mar 2018 04:15) (80 - 92)  BP: 113/63 (12 Mar 2018 04:15) (113/63 - 122/70)  BP(mean): --  RR: 17 (12 Mar 2018 04:15) (15 - 18)  SpO2: 94% (12 Mar 2018 04:15) (94% - 96%)    PHYSICAL EXAM:    Constitutional: NAD, well-developed  HEENT: EOMI, throat clear  Neck: No LAD, supple  Respiratory: CTA and P  Cardiovascular: S1 and S2, RRR, no M  Gastrointestinal: BS+, soft, NT/ND, neg HSM,  Extremities: No peripheral edema, neg clubing, cyanosis  Vascular: 2+ peripheral pulses  Neurological: A/O x 3, no focal deficits  Psychiatric: Normal mood, normal affect  Skin: No rashes      LABS:                        11.1   7.5   )-----------( 227      ( 11 Mar 2018 09:26 )             34.3     03-11    140  |  105  |  30<H>  ----------------------------<  131<H>  4.1   |  29  |  0.69    Ca    9.1      11 Mar 2018 09:26  Mg     2.0     03-11            RADIOLOGY & ADDITIONAL TESTS:

## 2018-03-12 NOTE — PROGRESS NOTE ADULT - PROBLEM SELECTOR PROBLEM 1
Anemia
Fever
Other closed fracture of shaft of right humerus, initial encounter
Other closed fracture of shaft of right humerus, initial encounter
Fever

## 2018-03-12 NOTE — PROGRESS NOTE ADULT - PROBLEM SELECTOR PROBLEM 3
Other closed fracture of shaft of right humerus, initial encounter
Hypothyroid
Depression
Hypothyroid
Other closed fracture of shaft of right humerus, initial encounter

## 2018-03-12 NOTE — PROGRESS NOTE ADULT - PROBLEM SELECTOR PLAN 2
right arm swelling is mildly improved with elevation and the loosening of the lower part of the coaptation splint -- has been on DVT ppx; difficult to eval for DVT without removing coaptation splint and risking displacement of the humeral shaft -- discussed with ortho who evaluated pt several days in a row for the swelling and Dr. Mitchell (ortho) states this level of swelling is common with the very tight coaptation splints due to edema. Arm elevation, squeezing hand exercises recommended by ortho. No sign of compartment syndrome. Edema with some improvement today -- again encouraged pt to elevate the arm.
Oral cipro as ordered  defer to medicine
c/w Synthroid
noted that alk phos has been elevated in the past to high 300s in 11/2017; reviewed with Dr. Jara who feels he can follow / work-up as outpt -- will give pt Dr. Jara's contact information for appt on discharge.  t bili normal; no biliary symptoms; has had cholecystectomy in the past  might be bone subset of alk phos; ?Paget's
right arm swelling is mildly improved with elevation and the loosening of the lower part of the coaptation splint -- has been on DVT ppx; difficult to eval for DVT without removing coaptation splint and risking displacement of the humeral shaft -- discussed with ortho who evaluated pt two days in a row for the swelling and Dr. Mitchell (ortho) states this level of swelling is common with the very tight coaptation splints due to edema. Arm elevation, squeezing hand exercises recommended by ortho. No sign of compartment syndrome.

## 2018-03-12 NOTE — PROGRESS NOTE ADULT - PROVIDER SPECIALTY LIST ADULT
Gastroenterology
Hospitalist
Orthopedics
pt c/o left sided abdominal pain since wednesday, pt states he was dirinking alcohol the day prior and had hematuria  for the past few days, pt states he has a hx of kidney, no GI issue hx no fall or injury
Infectious Disease
Hospitalist

## 2018-03-12 NOTE — PROGRESS NOTE ADULT - PROBLEM SELECTOR PROBLEM 4
Alkaline phosphatase elevation
ACP (advance care planning)
Alkaline phosphatase elevation
Depression
GERD (gastroesophageal reflux disease)

## 2018-03-12 NOTE — PROGRESS NOTE ADULT - SUBJECTIVE AND OBJECTIVE BOX
CHIEF COMPLAINT/INTERVAL HISTORY:  Pt. seen and evaluated for R. humerus fracture.  Pt. is in no distress.  Tolerating oral antibiotics.      REVIEW OF SYSTEMS:  No fever, CP, or SOB.    Vital Signs Last 24 Hrs  T(C): 36.8 (12 Mar 2018 04:15), Max: 37.1 (11 Mar 2018 13:54)  T(F): 98.2 (12 Mar 2018 04:15), Max: 98.8 (11 Mar 2018 13:54)  HR: 80 (12 Mar 2018 04:15) (80 - 92)  BP: 113/63 (12 Mar 2018 04:15) (113/63 - 122/70)  BP(mean): --  RR: 17 (12 Mar 2018 04:15) (15 - 18)  SpO2: 94% (12 Mar 2018 04:15) (94% - 96%)    PHYSICAL EXAM:  GENERAL: NAD  HEENT: EOMI, hearing normal, conjunctiva and sclera clear  Chest: CTA bilaterally, no wheezing  CV: S1S2, RRR,   GI: soft, +BS, NT/ND  Musculoskeletal: RUE in splint with edema of the R. hand and forearm (unchanged)  Psychiatric: affect nL, mood nL  Skin: warm and dry    LABS:                        11.1   7.5   )-----------( 227      ( 11 Mar 2018 09:26 )             34.3     03-11    140  |  105  |  30<H>  ----------------------------<  131<H>  4.1   |  29  |  0.69    Ca    9.1      11 Mar 2018 09:26  Mg     2.0     03-11      Assessment and Plan:  -s/p fall with R. humerus fracture:  s/p closed reduction and splinting.  Dr. Mitchell (ortho) states this level of swelling is common with the very tight coaptation splints due to edema. Arm elevation, squeezing hand exercises recommended by ortho. No sign of compartment syndrome.  Tylenol PRN.  -UTI:  +E. coli in urine cx.  Continue Cipro 500mg PO Q12h  -Elevated Alk Phos:  No biliary dilatation on US.  Noted that alk phos has been elevated in the past to high 300s in 11/2017; reviewed with Dr. Jara who feels he can follow / work-up as outpt -- will give pt Dr. Jara's contact information for appt on discharge.  -Anemia:  No gross bleeding and denies having any pain other than from fracture.  Stool OB positive.  Hbg stable.    -Hypothyroidism:  continue Synthroid 25mcg PO daily  -HLD: continue lipitor and zetia.    -VTE ppx:  Lovenox 40mg SQ daily

## 2018-03-12 NOTE — PROGRESS NOTE ADULT - PROBLEM SELECTOR PLAN 3
traumatic Mid-humeral shaft fracture from heavy fall at home  now s/p closed reduction and splinting with ortho  no surgical intervention warranted as per ortho recs  see problem #2 regarding arm edema  f/up as outpatient with ortho  Pain control with tylenol, percocet  Pt described fall as mechanical and pt's family who saw the pt within a minute of the fall report she was conscious and alert at the time. Doubt syncope.
Synthroid as prescribed
c/w Synthroid
c/w trazadone, doxepin, paroxetine
traumatic Mid-humeral shaft fracture from heavy fall at home  now s/p closed reduction and splinting with ortho  no surgical intervention warranted as per ortho recs  see problem #2 regarding arm edema  f/up as outpatient with ortho  Pain control with tylenol, percocet  Pt described fall as mechanical and pt's family who saw the pt within a minute of the fall report she was conscious and alert at the time.

## 2018-03-12 NOTE — PROGRESS NOTE ADULT - PROBLEM SELECTOR PLAN 4
noted that alk phos has been elevated in the past to high 300s in 11/2017; reviewed with Dr. Jara who feels he can follow / work-up as outpt -- will give pt Dr. Jara's contact information for appt on discharge.  t bili normal; no biliary symptoms; has had cholecystectomy in the past  might be bone subset of alk phos; ?Paget's -- outpt w/up  -low suspicion for biliary issues given above, but since pt has evidence of infection, will r/out biliary dilatation with US (no significant downside to performing US)
Advanced care planning was discussed with patient and family.  Advanced care planning forms were reviewed and discussed.  Risks, benefits and alternatives of gastroenterologic procedures were discussed in detail and all questions were answered.    25 minutes spent.
c/w protonix
c/w trazadone, doxepin, paroxetine
noted that alk phos has been elevated in the past to high 300s in 11/2017; reviewed with Dr. Jara who feels he can follow / work-up as outpt -- will give pt Dr. Jara's contact information for appt on discharge.  t bili normal; no biliary symptoms; has had cholecystectomy in the past  might be bone subset of alk phos; ?Paget's

## 2018-03-12 NOTE — PROGRESS NOTE ADULT - PROBLEM SELECTOR PLAN 1
likely due to UTI  ROS are generally negative, but pt cannot gauge urinary frequency/urgency -- UCx now back with >100K E coli; sample obtained via straight cath in setting of fever and worsening mentation so pt likely has UTI with some element of acute metabolic encephalopathy  -started rocephin  CXR clear  f/up sensitivities of UCx
monitor H/H daily, currently stable  FOBT positive however no overt bleeding noted  will hold off on egd/colonoscopic work up unless pt develops s/s large volume GIB  otherwise would pursue as outpatient   iron studies noted, pt is not iron deficient   ppi once a day
Mid-humeral shaft fracture, s/p closed reduction and splinting with ortho  no surgical intervention warranted as per ortho recs  F/up rehab placement with ; needs 3-night stay for Medicare rules.  f/up as outpatient with ortho  Pain control with tylenol, percocet  Pt described fall as mechanical and pt's family who saw the pt within a minute of the fall report she was conscious and alert at the time.
traumatic Mid-humeral shaft fracture from heavy fall at home  now s/p closed reduction and splinting with ortho  no surgical intervention warranted as per ortho recs  now with worsening edema of the right forearm/hand -- called ortho to adjust/loosen the splint, which they did; encouraged pt to have arm elevation on pillows provided  -if swelling persists or worsens, consider imaging  F/up rehab placement with ; needs 3-night stay for Medicare rules.  f/up as outpatient with ortho  Pain control with tylenol, percocet  Pt described fall as mechanical and pt's family who saw the pt within a minute of the fall report she was conscious and alert at the time.
unclear etiology  no clear source on ROS which are largely negative.  CXR appears clear ; f/up official radiology report  f/up cultures and UA  ?atelectasis  right arm swelling is mildly improved with elevation and the loosening of the lower part of the coaptation splint -- has been on DVT ppx; difficult to eval for DVT without removing coaptation splint and risking displacement of the humeral shaft -- discussed with ortho who evaluated pt two days in a row for the swelling and Dr. Mitchell (ortho) states this level of swelling is common with the very tight coaptation splints due to edema. Arm elevation, squeezing hand exercises recommended by ortho. No sign of compartment syndrome.

## 2018-03-14 LAB
CULTURE RESULTS: SIGNIFICANT CHANGE UP
CULTURE RESULTS: SIGNIFICANT CHANGE UP
SPECIMEN SOURCE: SIGNIFICANT CHANGE UP
SPECIMEN SOURCE: SIGNIFICANT CHANGE UP

## 2018-11-15 NOTE — H&P ADULT - END GEN HX ROS MEA POS PC
cold intolerance

## 2019-09-17 NOTE — ED PROVIDER NOTE - NS ED MD DISPO ADMITTING SERVICE
09/17/19@ 4:15PM   Pt called stating her Endocrinologist asked her to call  To see if OK for her to restart her Humira because her infusion did not work. She had a bad reaction from it.   Pt has upcoming appt on 9/30 /19@ 1:20pm.   Please advise   Pt's call back # [phone]    Thanks Michael THAPA    MED

## 2023-05-31 NOTE — INPATIENT CERTIFICATION FOR MEDICARE PATIENTS - PHYSICIAN CONCUR
I concur with the Admission Order and I certify that services are provided in accordance with Section 42 CFR § 412.3 Localized Dermabrasion Text: The patient was draped in routine manner.  Localized dermabrasion using 3 x 17 mm wire brush was performed in routine manner to papillary dermis. This spot dermabrasion is being performed to complete skin cancer reconstruction. It also will eliminate the other sun damaged precancerous cells that are known to be part of the regional effect of a lifetime's worth of sun exposure. This localized dermabrasion is therapeutic and should not be considered cosmetic in any regard. Localized Dermabrasion With Wire Brush Text: The patient was draped in routine manner.  Localized dermabrasion using 3 x 17 mm wire brush was performed in routine manner to papillary dermis. This spot dermabrasion is being performed to complete skin cancer reconstruction. It also will eliminate the other sun damaged precancerous cells that are known to be part of the regional effect of a lifetime's worth of sun exposure. This localized dermabrasion is therapeutic and should not be considered cosmetic in any regard.

## 2023-07-15 NOTE — ED PROVIDER NOTE - CARE PLAN
Goal Outcome Evaluation:  Plan of Care Reviewed With: patient        Progress: no change  Outcome Evaluation: patient alert and oriented. medicated with prn pain medication x2. blood glucose monitored. no other changes at this time.          Principal Discharge DX:	Fracture closed, humerus

## 2023-09-15 NOTE — H&P ADULT - BREASTS
Start Betamethasone cream twice daily   Follow up with dermatology  Reduce Metoprolol to 25 mg daily   No masses; no nipple discharge

## 2024-05-29 NOTE — ED ADULT NURSE NOTE - ED STAT RN HANDOFF TIME
Physical Therapy Visit    Visit Type: Daily Treatment Note  Visit: 5  Referring Provider: Bharat Rzea MD  Medical Diagnosis (from order): M75.21 - Tendonitis of upper biceps tendon of right shoulder     SUBJECTIVE                                                                                                               Pt reports she hasn't had muscle twitching or burning in her arm. Still feels some numbness when doing the pectoral stretch. States she doesn't really sleep on her right side due to feeling like she pushes her shoulder in. States she does feel like things are getting better. Continues to have some difficulty with prolonged driving.    Pain / Symptoms  - Patient denies pain / symptoms.      OBJECTIVE                                                                                                                                    Outcome/Assessments  Outcome Measures:   Quick Disabilities of the Arm, Shoulder and Hand: QuickDash Total Score (Score will not calculate if more then 2 questions are left blank): 6.82   (scored 0-100; a higher score indicates greater disability) see flowsheet for additional documentation      Treatment     Therapeutic Exercise  Performed:  UE bike 4 min backwards level 1  Latissimus pull down 10# 2x10  Prone bilateral shoulder extension 2# 2x10  Prone T (palms down) 2# 2x10  Theracane to mid scapular region right x a few minutes  Right shoulder internal rotation green band 2x10  Right shoulder external rotation green band 2x10    Not performed:  Bilateral shoulder extension red band 2x10  Standing Shoulder Row with red band x10 reps  standing bicep stretch 20 seconds x3  Overhead raise with eccentric flexion down 2# 2x10  Overhead raise with eccentric scaption 2# 2x10  Lateral step out red band at 90 degrees flexion (hands on wall) 2x10  Lower trapezius raise away from wall x12, then with red band x10  Shoulder External Rotation and Scapular Retraction with red band 2x15  reps  Red plyoball scapular stabilization circles at 90 degrees abduction and flexion 2x10 CW and CCW bilateral   Sidelying shoulder external rotation 1# with towel under axilla 2x10  Corner Pec Major Stretch 3 reps - 30 seconds   Pec minor stretch in corner 3x30 seconds    Manual Therapy   Not today:  Soft tissue mobilization right proximal biceps, right pectorals in supine  Soft tissue mobilization to right upper trapezius    Skilled input: verbal instruction/cues and tactile instruction/cues    Writer verbally educated and received verbal consent for hand placement, positioning of patient, and techniques to be performed today from patient, patient's guardian and patient's parent for clothing adjustments for techniques, therapist position for techniques and hand placement and palpation for techniques as described above and how they are pertinent to the patient's plan of care.  Home Exercise Program  Access Code: [email or web address]  URL: [email or web address]  Date: 05/08/2024  Prepared by: Stacey Wadas    Exercises  - Corner Pec Major Stretch  - 2 x daily - 1 sets - 3 reps - 30 seconds hold  - Standing Shoulder Row with Anchored Resistance  - 2 x daily - 2 sets - 10 reps  - Shoulder External Rotation and Scapular Retraction with Resistance  - 2 x daily - 2 sets - 10 reps  - Prone Scapular Retraction Arms at Side  - 2 x daily - 2 sets - 10 reps  - Prone Scapular Slide with Shoulder Extension  - 2 x daily - 2 sets - 10 reps  - Standing Wall Ball Circles with Mini Swiss Ball  - 2 x daily - 1-2 sets - 10 reps  - Shoulder Internal Rotation with Resistance  - 2 x daily - 2 sets - 10 reps      ASSESSMENT                                                                                                            Pt's main complaints of burning and numbness into right UE have subsided since initiating therapy. Mild symptoms continue to be present with prolonged driving and typing. Pt is currently independent in  her home exercise program and tolerated progression of weight this visit. Continue to see pt for 1-2 more visits focusing on scapular strengthening.  Education:   - Results of above outlined education: Verbalizes understanding and Demonstrates understanding    PLAN                                                                                                                           Suggestions for next session as indicated: Progress per plan of care - reassess next visit       Therapy procedure time and total treatment time can be found documented on the Time Entry flowsheet     07:14